# Patient Record
Sex: FEMALE | Race: WHITE
[De-identification: names, ages, dates, MRNs, and addresses within clinical notes are randomized per-mention and may not be internally consistent; named-entity substitution may affect disease eponyms.]

---

## 2017-03-06 ENCOUNTER — APPOINTMENT (OUTPATIENT)
Dept: MAMMOGRAPHY | Facility: HOSPITAL | Age: 42
End: 2017-03-06

## 2017-03-06 ENCOUNTER — APPOINTMENT (OUTPATIENT)
Dept: ULTRASOUND IMAGING | Facility: HOSPITAL | Age: 42
End: 2017-03-06

## 2017-03-06 ENCOUNTER — OUTPATIENT (OUTPATIENT)
Dept: OUTPATIENT SERVICES | Facility: HOSPITAL | Age: 42
LOS: 1 days | End: 2017-03-06
Payer: COMMERCIAL

## 2017-03-06 DIAGNOSIS — R92.2 INCONCLUSIVE MAMMOGRAM: ICD-10-CM

## 2017-03-06 PROCEDURE — G0279: CPT

## 2017-03-06 PROCEDURE — 76641 ULTRASOUND BREAST COMPLETE: CPT

## 2017-03-06 PROCEDURE — 77065 DX MAMMO INCL CAD UNI: CPT

## 2018-08-20 ENCOUNTER — APPOINTMENT (OUTPATIENT)
Dept: OBGYN | Facility: CLINIC | Age: 43
End: 2018-08-20
Payer: COMMERCIAL

## 2018-08-20 ENCOUNTER — ASOB RESULT (OUTPATIENT)
Age: 43
End: 2018-08-20

## 2018-08-20 VITALS
SYSTOLIC BLOOD PRESSURE: 121 MMHG | BODY MASS INDEX: 23.28 KG/M2 | WEIGHT: 126.5 LBS | HEART RATE: 66 BPM | DIASTOLIC BLOOD PRESSURE: 73 MMHG | HEIGHT: 62 IN

## 2018-08-20 PROCEDURE — 76830 TRANSVAGINAL US NON-OB: CPT

## 2018-08-20 PROCEDURE — 99202 OFFICE O/P NEW SF 15 MIN: CPT

## 2018-08-21 LAB — BACTERIA UR CULT: NORMAL

## 2018-10-05 ENCOUNTER — APPOINTMENT (OUTPATIENT)
Dept: OBGYN | Facility: CLINIC | Age: 43
End: 2018-10-05

## 2019-10-09 ENCOUNTER — NON-APPOINTMENT (OUTPATIENT)
Age: 44
End: 2019-10-09

## 2019-10-09 ENCOUNTER — TRANSCRIPTION ENCOUNTER (OUTPATIENT)
Age: 44
End: 2019-10-09

## 2019-10-09 ENCOUNTER — APPOINTMENT (OUTPATIENT)
Dept: FAMILY MEDICINE | Facility: CLINIC | Age: 44
End: 2019-10-09
Payer: COMMERCIAL

## 2019-10-09 VITALS
HEIGHT: 61 IN | TEMPERATURE: 97.8 F | DIASTOLIC BLOOD PRESSURE: 82 MMHG | OXYGEN SATURATION: 98 % | SYSTOLIC BLOOD PRESSURE: 114 MMHG | BODY MASS INDEX: 23.41 KG/M2 | WEIGHT: 124 LBS | RESPIRATION RATE: 16 BRPM | HEART RATE: 75 BPM

## 2019-10-09 DIAGNOSIS — Z00.00 ENCOUNTER FOR GENERAL ADULT MEDICAL EXAMINATION W/OUT ABNORMAL FINDINGS: ICD-10-CM

## 2019-10-09 DIAGNOSIS — R10.2 PELVIC AND PERINEAL PAIN: ICD-10-CM

## 2019-10-09 DIAGNOSIS — Z87.42 PERSONAL HISTORY OF OTHER DISEASES OF THE FEMALE GENITAL TRACT: ICD-10-CM

## 2019-10-09 DIAGNOSIS — Z80.52 FAMILY HISTORY OF MALIGNANT NEOPLASM OF BLADDER: ICD-10-CM

## 2019-10-09 PROCEDURE — 93000 ELECTROCARDIOGRAM COMPLETE: CPT

## 2019-10-09 PROCEDURE — G0444 DEPRESSION SCREEN ANNUAL: CPT | Mod: 59

## 2019-10-09 PROCEDURE — 99386 PREV VISIT NEW AGE 40-64: CPT | Mod: 25

## 2019-10-09 PROCEDURE — 99406 BEHAV CHNG SMOKING 3-10 MIN: CPT

## 2019-10-16 LAB
25(OH)D3 SERPL-MCNC: 30.3 NG/ML
ALBUMIN SERPL ELPH-MCNC: 4.5 G/DL
ALP BLD-CCNC: 50 U/L
ALT SERPL-CCNC: 9 U/L
ANION GAP SERPL CALC-SCNC: 12 MMOL/L
APPEARANCE: CLEAR
AST SERPL-CCNC: 13 U/L
BASOPHILS # BLD AUTO: 0.04 K/UL
BASOPHILS NFR BLD AUTO: 0.5 %
BILIRUB SERPL-MCNC: 0.2 MG/DL
BILIRUBIN URINE: NEGATIVE
BLOOD URINE: ABNORMAL
BUN SERPL-MCNC: 12 MG/DL
CALCIUM SERPL-MCNC: 9.3 MG/DL
CHLORIDE SERPL-SCNC: 102 MMOL/L
CHOLEST SERPL-MCNC: 210 MG/DL
CHOLEST/HDLC SERPL: 3.5 RATIO
CO2 SERPL-SCNC: 26 MMOL/L
COLOR: NORMAL
CREAT SERPL-MCNC: 0.78 MG/DL
EOSINOPHIL # BLD AUTO: 0.04 K/UL
EOSINOPHIL NFR BLD AUTO: 0.5 %
ESTIMATED AVERAGE GLUCOSE: 105 MG/DL
FOLATE SERPL-MCNC: 17.2 NG/ML
GLUCOSE QUALITATIVE U: NEGATIVE
GLUCOSE SERPL-MCNC: 87 MG/DL
HAV IGM SER QL: NONREACTIVE
HBA1C MFR BLD HPLC: 5.3 %
HBV CORE IGM SER QL: NONREACTIVE
HBV SURFACE AG SER QL: NONREACTIVE
HCT VFR BLD CALC: 43.7 %
HCV AB SER QL: NONREACTIVE
HCV S/CO RATIO: 0.14 S/CO
HDLC SERPL-MCNC: 60 MG/DL
HGB BLD-MCNC: 14.3 G/DL
HIV1+2 AB SPEC QL IA.RAPID: NONREACTIVE
IMM GRANULOCYTES NFR BLD AUTO: 0.2 %
KETONES URINE: NEGATIVE
LDLC SERPL CALC-MCNC: 133 MG/DL
LEUKOCYTE ESTERASE URINE: NEGATIVE
LYMPHOCYTES # BLD AUTO: 2.7 K/UL
LYMPHOCYTES NFR BLD AUTO: 31.8 %
MAN DIFF?: NORMAL
MCHC RBC-ENTMCNC: 30 PG
MCHC RBC-ENTMCNC: 32.7 GM/DL
MCV RBC AUTO: 91.6 FL
MONOCYTES # BLD AUTO: 0.56 K/UL
MONOCYTES NFR BLD AUTO: 6.6 %
NEUTROPHILS # BLD AUTO: 5.13 K/UL
NEUTROPHILS NFR BLD AUTO: 60.4 %
NITRITE URINE: NEGATIVE
PH URINE: 7.5
PLATELET # BLD AUTO: 225 K/UL
POTASSIUM SERPL-SCNC: 4.4 MMOL/L
PROT SERPL-MCNC: 6.6 G/DL
PROTEIN URINE: NEGATIVE
RBC # BLD: 4.77 M/UL
RBC # FLD: 12.1 %
SODIUM SERPL-SCNC: 140 MMOL/L
SPECIFIC GRAVITY URINE: 1.01
TRIGL SERPL-MCNC: 84 MG/DL
TSH SERPL-ACNC: 1.64 UIU/ML
URATE SERPL-MCNC: 3.6 MG/DL
UROBILINOGEN URINE: NORMAL
VIT B12 SERPL-MCNC: 243 PG/ML
WBC # FLD AUTO: 8.49 K/UL

## 2019-10-18 ENCOUNTER — OTHER (OUTPATIENT)
Age: 44
End: 2019-10-18

## 2019-10-18 DIAGNOSIS — Z01.818 ENCOUNTER FOR OTHER PREPROCEDURAL EXAMINATION: ICD-10-CM

## 2019-10-19 LAB
APTT BLD: 30.6 SEC
INR PPP: 0.98 RATIO
PT BLD: 11.1 SEC

## 2019-10-22 ENCOUNTER — FORM ENCOUNTER (OUTPATIENT)
Age: 44
End: 2019-10-22

## 2019-10-23 ENCOUNTER — OUTPATIENT (OUTPATIENT)
Dept: OUTPATIENT SERVICES | Facility: HOSPITAL | Age: 44
LOS: 1 days | End: 2019-10-23
Payer: COMMERCIAL

## 2019-10-23 ENCOUNTER — APPOINTMENT (OUTPATIENT)
Dept: ULTRASOUND IMAGING | Facility: HOSPITAL | Age: 44
End: 2019-10-23
Payer: COMMERCIAL

## 2019-10-23 ENCOUNTER — APPOINTMENT (OUTPATIENT)
Dept: MAMMOGRAPHY | Facility: HOSPITAL | Age: 44
End: 2019-10-23
Payer: COMMERCIAL

## 2019-10-23 DIAGNOSIS — Z00.8 ENCOUNTER FOR OTHER GENERAL EXAMINATION: ICD-10-CM

## 2019-10-23 PROCEDURE — 76641 ULTRASOUND BREAST COMPLETE: CPT | Mod: 26,50

## 2019-10-23 PROCEDURE — 76641 ULTRASOUND BREAST COMPLETE: CPT

## 2019-10-23 PROCEDURE — 77067 SCR MAMMO BI INCL CAD: CPT | Mod: 26

## 2019-10-23 PROCEDURE — 77063 BREAST TOMOSYNTHESIS BI: CPT

## 2019-10-23 PROCEDURE — 77063 BREAST TOMOSYNTHESIS BI: CPT | Mod: 26

## 2019-10-23 PROCEDURE — 77067 SCR MAMMO BI INCL CAD: CPT

## 2019-10-28 ENCOUNTER — APPOINTMENT (OUTPATIENT)
Dept: FAMILY MEDICINE | Facility: CLINIC | Age: 44
End: 2019-10-28
Payer: COMMERCIAL

## 2019-10-28 VITALS
TEMPERATURE: 98.1 F | OXYGEN SATURATION: 96 % | SYSTOLIC BLOOD PRESSURE: 102 MMHG | BODY MASS INDEX: 23.41 KG/M2 | HEART RATE: 60 BPM | DIASTOLIC BLOOD PRESSURE: 72 MMHG | WEIGHT: 124 LBS | RESPIRATION RATE: 17 BRPM | HEIGHT: 61 IN

## 2019-10-28 DIAGNOSIS — E78.5 HYPERLIPIDEMIA, UNSPECIFIED: ICD-10-CM

## 2019-10-28 LAB — HCG SERPL-MCNC: <1 MIU/ML

## 2019-10-28 PROCEDURE — 36415 COLL VENOUS BLD VENIPUNCTURE: CPT

## 2019-10-28 PROCEDURE — 99214 OFFICE O/P EST MOD 30 MIN: CPT | Mod: 25

## 2019-10-28 NOTE — COUNSELING
[Fall prevention counseling provided] : Fall prevention counseling provided [Adequate lighting] : Adequate lighting [Use proper foot wear] : Use proper foot wear [No throw rugs] : No throw rugs [Use recommended devices] : Use recommended devices [Risk of tobacco use and health benefits of smoking cessation discussed] : Risk of tobacco use and health benefits of smoking cessation discussed [Cessation strategies including cessation program discussed] : Cessation strategies including cessation program discussed [Use of nicotine replacement therapies and other medications discussed] : Use of nicotine replacement therapies and other medications discussed [Encouraged to pick a quit date and identify support needed to quit] : Encouraged to pick a quit date and identify support needed to quit [Willing to Quit Smoking] : Willing to quit smoking [Tobacco Use Cessation Intermediate Greater Than 3 Minutes Up to 10 Minutes] : Tobacco Use Cessation Intermediate Greater Than 3 Minutes Up to 10 Minutes [Good understanding] : Patient has a good understanding of lifestyle changes and steps needed to achieve self management goal [FreeTextEntry1] : 3

## 2019-10-28 NOTE — PHYSICAL EXAM
[No Varicosities] : no varicosities [No Edema] : there was no peripheral edema [Normal] : affect was normal and insight and judgment were intact

## 2019-10-28 NOTE — HEALTH RISK ASSESSMENT
[Very Good] : ~his/her~  mood as very good [] : Yes [21-25] : 21-25 [Yes] : Yes [Monthly or less (1 pt)] : Monthly or less (1 point) [1 or 2 (0 pts)] : 1 or 2 (0 points) [Never (0 pts)] : Never (0 points) [No] : In the past 12 months have you used drugs other than those required for medical reasons? No [No falls in past year] : Patient reported no falls in the past year [0] : 2) Feeling down, depressed, or hopeless: Not at all (0) [Patient reported mammogram was normal] : Patient reported mammogram was normal [HIV Test offered] : HIV Test offered [Hepatitis C test offered] : Hepatitis C test offered [None] : None [With Family] : lives with family [Employed] : employed [High School] : high school [# Of Children ___] : has [unfilled] children [] :  [Sexually Active] : sexually active [Fully functional (bathing, dressing, toileting, transferring, walking, feeding)] : Fully functional (bathing, dressing, toileting, transferring, walking, feeding) [Feels Safe at Home] : Feels safe at home [Fully functional (using the telephone, shopping, preparing meals, housekeeping, doing laundry, using] : Fully functional and needs no help or supervision to perform IADLs (using the telephone, shopping, preparing meals, housekeeping, doing laundry, using transportation, managing medications and managing finances) [Carbon Monoxide Detector] : carbon monoxide detector [Smoke Detector] : smoke detector [Seat Belt] :  uses seat belt [Sunscreen] : uses sunscreen [With Patient/Caregiver] : With Patient/Caregiver [Aggressive treatment] : aggressive treatment [FreeTextEntry1] : none [de-identified] : No [de-identified] : none [Audit-CScore] : 1 [de-identified] : [employer]  [de-identified] : [place]  [[id number]] : 0 [Change in mental status noted] : No change in mental status noted [Language] : denies difficulty with language [Reports changes in hearing] : Reports no changes in hearing [Reports changes in vision] : Reports no changes in vision [Reports changes in dental health] : Reports no changes in dental health [MammogramDate] : 07/16 [PapSmearDate] : 11/14 [BoneDensityDate] : never [ColonoscopyDate] : never [AdvancecareDate] : 10/19

## 2019-10-28 NOTE — PHYSICAL EXAM
[Normal] : normal rate, regular rhythm, normal S1 and S2 and no murmur heard [No Edema] : there was no peripheral edema [No Varicosities] : no varicosities [Normal Appearance] : normal in appearance [No Masses] : no palpable masses [No Nipple Discharge] : no nipple discharge [No Axillary Lymphadenopathy] : no axillary lymphadenopathy [de-identified] : + b/l implants

## 2019-10-28 NOTE — HISTORY OF PRESENT ILLNESS
[FreeTextEntry1] : cc: establish new pt , physical  [de-identified] : Patient came  for physical. She denies CP,SOB,Abd pain, no N,V,C,D.\par \par

## 2019-10-28 NOTE — HISTORY OF PRESENT ILLNESS
[FreeTextEntry1] : cc: f/u lipids, microhematuria, pt needs a clearance for her plastic surgery  [de-identified] : Patient denies CP, SOB, abd pain , She will get  b/l upper and lower blepharoplasty  on 10/30/19 -Dr. Angel Vicente . She had recent CPE , only HCG will be done today , otherwise patient is medically stabilized for her procedure . patient's recent blood work show elevated lipids - chronic , patient is watching her diet. Patient deneis Hx of renal stones, not sure about her period.

## 2019-10-28 NOTE — ADDENDUM
[FreeTextEntry1] : Patient is getting done b/l  upper and lower  blepharoplasty on 10/30/19  done by Dr. Angel Vicente. Patient is medically optimized for proposed procedure. Pregnancy test done today , f/u results . \par \par \par

## 2019-10-28 NOTE — DATA REVIEWED
[FreeTextEntry1] : EKG - NSR  AT 69 BPM, NO ACUTEST - T CHANGES \par \par last blood work d/w the pt at length\par

## 2019-11-06 ENCOUNTER — APPOINTMENT (OUTPATIENT)
Dept: OBGYN | Facility: CLINIC | Age: 44
End: 2019-11-06
Payer: COMMERCIAL

## 2019-11-06 VITALS
HEART RATE: 74 BPM | SYSTOLIC BLOOD PRESSURE: 124 MMHG | HEIGHT: 61 IN | DIASTOLIC BLOOD PRESSURE: 83 MMHG | WEIGHT: 127 LBS | BODY MASS INDEX: 23.98 KG/M2

## 2019-11-06 DIAGNOSIS — Z01.419 ENCOUNTER FOR GYNECOLOGICAL EXAMINATION (GENERAL) (ROUTINE) W/OUT ABNORMAL FINDINGS: ICD-10-CM

## 2019-11-06 DIAGNOSIS — R31.29 OTHER MICROSCOPIC HEMATURIA: ICD-10-CM

## 2019-11-06 PROCEDURE — 99396 PREV VISIT EST AGE 40-64: CPT

## 2019-11-12 LAB
BACTERIA UR CULT: NORMAL
CYTOLOGY CVX/VAG DOC THIN PREP: NORMAL
HPV HIGH+LOW RISK DNA PNL CVX: NOT DETECTED

## 2019-11-12 NOTE — HISTORY OF PRESENT ILLNESS
[1 Year Ago] : 1 year ago [Regular Exercise] : regular exercise [Last Mammogram ___] : Last Mammogram was [unfilled] [Last Pap ___] : Last cervical pap smear was [unfilled] [Contraception] : uses contraception [Tubal Occlusion] : has had a tubal occlusion [Sexually Active] : is sexually active [Monogamous (Male Partner)] : is monogamous with a male partner [Regular Cycle Intervals] : periods have been regular [Weight Concerns] : no concerns with her weight

## 2020-08-25 ENCOUNTER — INPATIENT (INPATIENT)
Facility: HOSPITAL | Age: 45
LOS: 6 days | Discharge: HOME HEALTH | DRG: 854 | End: 2020-09-01
Attending: SPECIALIST | Admitting: SURGERY
Payer: COMMERCIAL

## 2020-08-25 ENCOUNTER — TRANSCRIPTION ENCOUNTER (OUTPATIENT)
Age: 45
End: 2020-08-25

## 2020-08-25 VITALS — HEIGHT: 62 IN | WEIGHT: 119.93 LBS

## 2020-08-25 DIAGNOSIS — Z98.51 TUBAL LIGATION STATUS: Chronic | ICD-10-CM

## 2020-08-25 DIAGNOSIS — Z98.82 BREAST IMPLANT STATUS: Chronic | ICD-10-CM

## 2020-08-25 DIAGNOSIS — K57.32 DIVERTICULITIS OF LARGE INTESTINE WITHOUT PERFORATION OR ABSCESS WITHOUT BLEEDING: ICD-10-CM

## 2020-08-25 LAB
APPEARANCE UR: CLEAR — SIGNIFICANT CHANGE UP
BASOPHILS # BLD AUTO: 0.06 K/UL — SIGNIFICANT CHANGE UP (ref 0–0.2)
BASOPHILS NFR BLD AUTO: 0.4 % — SIGNIFICANT CHANGE UP (ref 0–2)
BILIRUB UR-MCNC: NEGATIVE — SIGNIFICANT CHANGE UP
COLOR SPEC: SIGNIFICANT CHANGE UP
DIFF PNL FLD: NEGATIVE — SIGNIFICANT CHANGE UP
EOSINOPHIL # BLD AUTO: 0.09 K/UL — SIGNIFICANT CHANGE UP (ref 0–0.5)
EOSINOPHIL NFR BLD AUTO: 0.6 % — SIGNIFICANT CHANGE UP (ref 0–6)
GLUCOSE UR QL: NEGATIVE MG/DL — SIGNIFICANT CHANGE UP
HCT VFR BLD CALC: 43 % — SIGNIFICANT CHANGE UP (ref 34.5–45)
HGB BLD-MCNC: 14.3 G/DL — SIGNIFICANT CHANGE UP (ref 11.5–15.5)
IMM GRANULOCYTES NFR BLD AUTO: 0.4 % — SIGNIFICANT CHANGE UP (ref 0–1.5)
KETONES UR-MCNC: NEGATIVE — SIGNIFICANT CHANGE UP
LACTATE SERPL-SCNC: 0.9 MMOL/L — SIGNIFICANT CHANGE UP (ref 0.7–2)
LEUKOCYTE ESTERASE UR-ACNC: NEGATIVE — SIGNIFICANT CHANGE UP
LYMPHOCYTES # BLD AUTO: 17.5 % — SIGNIFICANT CHANGE UP (ref 13–44)
LYMPHOCYTES # BLD AUTO: 2.47 K/UL — SIGNIFICANT CHANGE UP (ref 1–3.3)
MCHC RBC-ENTMCNC: 30.3 PG — SIGNIFICANT CHANGE UP (ref 27–34)
MCHC RBC-ENTMCNC: 33.3 GM/DL — SIGNIFICANT CHANGE UP (ref 32–36)
MCV RBC AUTO: 91.1 FL — SIGNIFICANT CHANGE UP (ref 80–100)
MONOCYTES # BLD AUTO: 0.79 K/UL — SIGNIFICANT CHANGE UP (ref 0–0.9)
MONOCYTES NFR BLD AUTO: 5.6 % — SIGNIFICANT CHANGE UP (ref 2–14)
NEUTROPHILS # BLD AUTO: 10.65 K/UL — HIGH (ref 1.8–7.4)
NEUTROPHILS NFR BLD AUTO: 75.5 % — SIGNIFICANT CHANGE UP (ref 43–77)
NITRITE UR-MCNC: NEGATIVE — SIGNIFICANT CHANGE UP
PH UR: 8 — SIGNIFICANT CHANGE UP (ref 5–8)
PLATELET # BLD AUTO: 209 K/UL — SIGNIFICANT CHANGE UP (ref 150–400)
PROT UR-MCNC: NEGATIVE MG/DL — SIGNIFICANT CHANGE UP
RBC # BLD: 4.72 M/UL — SIGNIFICANT CHANGE UP (ref 3.8–5.2)
RBC # FLD: 12.3 % — SIGNIFICANT CHANGE UP (ref 10.3–14.5)
SARS-COV-2 RNA SPEC QL NAA+PROBE: SIGNIFICANT CHANGE UP
SP GR SPEC: 1.01 — SIGNIFICANT CHANGE UP (ref 1.01–1.02)
UROBILINOGEN FLD QL: NEGATIVE MG/DL — SIGNIFICANT CHANGE UP
WBC # BLD: 14.11 K/UL — HIGH (ref 3.8–10.5)
WBC # FLD AUTO: 14.11 K/UL — HIGH (ref 3.8–10.5)

## 2020-08-25 PROCEDURE — 86901 BLOOD TYPING SEROLOGIC RH(D): CPT

## 2020-08-25 PROCEDURE — C1765: CPT

## 2020-08-25 PROCEDURE — 85610 PROTHROMBIN TIME: CPT

## 2020-08-25 PROCEDURE — 71045 X-RAY EXAM CHEST 1 VIEW: CPT

## 2020-08-25 PROCEDURE — 99222 1ST HOSP IP/OBS MODERATE 55: CPT

## 2020-08-25 PROCEDURE — 86900 BLOOD TYPING SEROLOGIC ABO: CPT

## 2020-08-25 PROCEDURE — 85730 THROMBOPLASTIN TIME PARTIAL: CPT

## 2020-08-25 PROCEDURE — 85027 COMPLETE CBC AUTOMATED: CPT

## 2020-08-25 PROCEDURE — 97161 PT EVAL LOW COMPLEX 20 MIN: CPT | Mod: GP

## 2020-08-25 PROCEDURE — 87070 CULTURE OTHR SPECIMN AEROBIC: CPT

## 2020-08-25 PROCEDURE — 84702 CHORIONIC GONADOTROPIN TEST: CPT

## 2020-08-25 PROCEDURE — 87102 FUNGUS ISOLATION CULTURE: CPT

## 2020-08-25 PROCEDURE — C1889: CPT

## 2020-08-25 PROCEDURE — 74019 RADEX ABDOMEN 2 VIEWS: CPT

## 2020-08-25 PROCEDURE — 86850 RBC ANTIBODY SCREEN: CPT

## 2020-08-25 PROCEDURE — 87040 BLOOD CULTURE FOR BACTERIA: CPT

## 2020-08-25 PROCEDURE — C9113: CPT

## 2020-08-25 PROCEDURE — U0003: CPT

## 2020-08-25 PROCEDURE — 93005 ELECTROCARDIOGRAM TRACING: CPT

## 2020-08-25 PROCEDURE — 84484 ASSAY OF TROPONIN QUANT: CPT

## 2020-08-25 PROCEDURE — 80053 COMPREHEN METABOLIC PANEL: CPT

## 2020-08-25 PROCEDURE — 80048 BASIC METABOLIC PNL TOTAL CA: CPT

## 2020-08-25 PROCEDURE — 88307 TISSUE EXAM BY PATHOLOGIST: CPT

## 2020-08-25 PROCEDURE — 84100 ASSAY OF PHOSPHORUS: CPT

## 2020-08-25 PROCEDURE — 36415 COLL VENOUS BLD VENIPUNCTURE: CPT

## 2020-08-25 PROCEDURE — 83605 ASSAY OF LACTIC ACID: CPT

## 2020-08-25 PROCEDURE — 82962 GLUCOSE BLOOD TEST: CPT

## 2020-08-25 PROCEDURE — 74177 CT ABD & PELVIS W/CONTRAST: CPT

## 2020-08-25 PROCEDURE — 83735 ASSAY OF MAGNESIUM: CPT

## 2020-08-25 PROCEDURE — 74177 CT ABD & PELVIS W/CONTRAST: CPT | Mod: 26

## 2020-08-25 PROCEDURE — 87075 CULTR BACTERIA EXCEPT BLOOD: CPT

## 2020-08-25 RX ORDER — NICOTINE POLACRILEX 2 MG
1 GUM BUCCAL DAILY
Refills: 0 | Status: DISCONTINUED | OUTPATIENT
Start: 2020-08-25 | End: 2020-08-27

## 2020-08-25 RX ORDER — KETOROLAC TROMETHAMINE 30 MG/ML
15 SYRINGE (ML) INJECTION EVERY 8 HOURS
Refills: 0 | Status: DISCONTINUED | OUTPATIENT
Start: 2020-08-25 | End: 2020-08-27

## 2020-08-25 RX ORDER — ONDANSETRON 8 MG/1
4 TABLET, FILM COATED ORAL EVERY 8 HOURS
Refills: 0 | Status: DISCONTINUED | OUTPATIENT
Start: 2020-08-25 | End: 2020-08-27

## 2020-08-25 RX ORDER — CIPROFLOXACIN LACTATE 400MG/40ML
400 VIAL (ML) INTRAVENOUS EVERY 12 HOURS
Refills: 0 | Status: DISCONTINUED | OUTPATIENT
Start: 2020-08-25 | End: 2020-08-27

## 2020-08-25 RX ORDER — NICOTINE POLACRILEX 2 MG
1 GUM BUCCAL DAILY
Refills: 0 | Status: DISCONTINUED | OUTPATIENT
Start: 2020-08-25 | End: 2020-08-25

## 2020-08-25 RX ORDER — PIPERACILLIN AND TAZOBACTAM 4; .5 G/20ML; G/20ML
3.38 INJECTION, POWDER, LYOPHILIZED, FOR SOLUTION INTRAVENOUS ONCE
Refills: 0 | Status: COMPLETED | OUTPATIENT
Start: 2020-08-25 | End: 2020-08-25

## 2020-08-25 RX ORDER — SODIUM CHLORIDE 9 MG/ML
1000 INJECTION, SOLUTION INTRAVENOUS
Refills: 0 | Status: DISCONTINUED | OUTPATIENT
Start: 2020-08-25 | End: 2020-08-27

## 2020-08-25 RX ORDER — PANTOPRAZOLE SODIUM 20 MG/1
40 TABLET, DELAYED RELEASE ORAL DAILY
Refills: 0 | Status: DISCONTINUED | OUTPATIENT
Start: 2020-08-25 | End: 2020-08-27

## 2020-08-25 RX ORDER — SODIUM CHLORIDE 9 MG/ML
1000 INJECTION INTRAMUSCULAR; INTRAVENOUS; SUBCUTANEOUS ONCE
Refills: 0 | Status: COMPLETED | OUTPATIENT
Start: 2020-08-25 | End: 2020-08-25

## 2020-08-25 RX ORDER — METRONIDAZOLE 500 MG
500 TABLET ORAL EVERY 8 HOURS
Refills: 0 | Status: DISCONTINUED | OUTPATIENT
Start: 2020-08-25 | End: 2020-08-27

## 2020-08-25 RX ORDER — MORPHINE SULFATE 50 MG/1
2 CAPSULE, EXTENDED RELEASE ORAL EVERY 6 HOURS
Refills: 0 | Status: DISCONTINUED | OUTPATIENT
Start: 2020-08-25 | End: 2020-08-27

## 2020-08-25 RX ORDER — HEPARIN SODIUM 5000 [USP'U]/ML
5000 INJECTION INTRAVENOUS; SUBCUTANEOUS EVERY 8 HOURS
Refills: 0 | Status: DISCONTINUED | OUTPATIENT
Start: 2020-08-25 | End: 2020-08-27

## 2020-08-25 RX ADMIN — Medication 1 PATCH: at 17:44

## 2020-08-25 RX ADMIN — MORPHINE SULFATE 2 MILLIGRAM(S): 50 CAPSULE, EXTENDED RELEASE ORAL at 22:25

## 2020-08-25 RX ADMIN — Medication 15 MILLIGRAM(S): at 17:00

## 2020-08-25 RX ADMIN — PIPERACILLIN AND TAZOBACTAM 3.38 GRAM(S): 4; .5 INJECTION, POWDER, LYOPHILIZED, FOR SOLUTION INTRAVENOUS at 15:33

## 2020-08-25 RX ADMIN — Medication 15 MILLIGRAM(S): at 16:42

## 2020-08-25 RX ADMIN — Medication 200 MILLIGRAM(S): at 17:43

## 2020-08-25 RX ADMIN — SODIUM CHLORIDE 100 MILLILITER(S): 9 INJECTION, SOLUTION INTRAVENOUS at 19:38

## 2020-08-25 RX ADMIN — SODIUM CHLORIDE 1000 MILLILITER(S): 9 INJECTION INTRAMUSCULAR; INTRAVENOUS; SUBCUTANEOUS at 14:53

## 2020-08-25 RX ADMIN — SODIUM CHLORIDE 1000 MILLILITER(S): 9 INJECTION INTRAMUSCULAR; INTRAVENOUS; SUBCUTANEOUS at 11:43

## 2020-08-25 RX ADMIN — SODIUM CHLORIDE 1000 MILLILITER(S): 9 INJECTION INTRAMUSCULAR; INTRAVENOUS; SUBCUTANEOUS at 14:52

## 2020-08-25 RX ADMIN — PANTOPRAZOLE SODIUM 40 MILLIGRAM(S): 20 TABLET, DELAYED RELEASE ORAL at 17:45

## 2020-08-25 RX ADMIN — Medication 100 MILLIGRAM(S): at 21:26

## 2020-08-25 RX ADMIN — Medication 1 PATCH: at 19:25

## 2020-08-25 RX ADMIN — HEPARIN SODIUM 5000 UNIT(S): 5000 INJECTION INTRAVENOUS; SUBCUTANEOUS at 21:26

## 2020-08-25 RX ADMIN — PIPERACILLIN AND TAZOBACTAM 200 GRAM(S): 4; .5 INJECTION, POWDER, LYOPHILIZED, FOR SOLUTION INTRAVENOUS at 14:52

## 2020-08-25 NOTE — H&P ADULT - NSHPPHYSICALEXAM_GEN_ALL_CORE
· CONSTITUTIONAL: well appearing and in no apparent distress.  · EYES: clear bilaterally.  Pupils equal, round, and reactive to light.  · ENMT: Nasal mucosa clear.  Mouth with normal mucosa  Throat has no vesicles, no oropharyngeal exudates and uvula is midline.  · CARDIAC: normal rate, regular rhythm, and no murmur.  · RESPIRATORY: breath sounds clear and equal bilaterally.  · GASTROINTESTINAL: abdomen soft, and non-distended. Bowel sounds present. RLQ tenderness.  · MUSCULOSKELETAL: range of motion is not limited and there is no muscle tenderness.  · NEUROLOGICAL: sensation is normal and strength is normal.  · SKIN: skin normal color for race, warm, dry and intact. · CONSTITUTIONAL: well appearing and in no apparent distress.  · EYES: anicteric  · ENMT: Nasal mucosa clear.  Mouth with normal mucosa    · CARDIAC: normal rate, regular rhythm, and no murmur.  · RESPIRATORY: breath sounds clear and equal bilaterally.  · GASTROINTESTINAL: abdomen soft, and non-distended. Bowel sounds present. RLQ tenderness.  · MUSCULOSKELETAL: range of motion is not limited and there is no muscle tenderness.  · NEUROLOGICAL: sensation is normal and strength is normal.  · SKIN: skin normal color for race, warm, dry and intact.

## 2020-08-25 NOTE — H&P ADULT - ATTENDING COMMENTS
Patient was seen and examined in ED. History, labs and imaging reviewed. She is non toxic on exam and abdomen is soft, non distended and tender in the lower abdomen more on the right without rebound or guarding. CT scan reviewed with diverticulitis, perforated without abscess or collection. Extraluminal air is adjacent to colon. Plan for non operative management of diverticulitis with bowel rest, IV abx, fluids and serial abdominal exam

## 2020-08-25 NOTE — PATIENT PROFILE ADULT - NSPROEDALEARNPREFOTH_GEN_A_NUR
computer/internet/group instruction/pictorial/verbal instruction/video/audio/skill demonstration/written material/individual instruction

## 2020-08-25 NOTE — ED ADULT NURSE NOTE - OBJECTIVE STATEMENT
Patient comes to ED for RLQ abdominal pain for 2 days. pt denies any n/v/d. Patient denies any fever or chills.

## 2020-08-25 NOTE — ED ADULT NURSE NOTE - ED STAT RN HANDOFF DETAILS
handoff report taken from day RN. pt. noted resting comfortably in stretcher. No distress noted, denies pain, safety maintained. WCTM

## 2020-08-25 NOTE — H&P ADULT - ASSESSMENT
45 y/o female with no significant pmhx presents to the ED c/o RUQ pain for two days. CT scan showed: Sigmoid diverticulitis with a localized perforation.    Plan:    - Admit to colorectal service under Dr. Oh.  - NPO.  - IVF  - IV cipro, flagyl  - Trend CBC.  - Pain control as needed.  - GI and DVT ppx.    The plan was discussed with Dr. Oh.

## 2020-08-25 NOTE — H&P ADULT - NSICDXPASTSURGICALHX_GEN_ALL_CORE_FT
PAST SURGICAL HISTORY:  H/O tubal ligation PAST SURGICAL HISTORY:  H/O breast augmentation     H/O tubal ligation

## 2020-08-25 NOTE — ED STATDOCS - ATTENDING CONTRIBUTION TO CARE
I, Marianne Keita DO,  performed the initial face to face bedside interview with this patient regarding history of present illness, review of symptoms and relevant past medical, social and family history.  I completed an independent physical examination.  I was the initial provider who evaluated this patient. I have signed out the follow up of any pending tests (i.e. labs, radiological studies) to the ACP.  I have communicated the patient’s plan of care and disposition with the ACP.  The history, relevant review of systems, past medical and surgical history, medical decision making, and physical examination was documented by the scribe in my presence and I attest to the accuracy of the documentation.

## 2020-08-25 NOTE — ED STATDOCS - OBJECTIVE STATEMENT
43 y/o female with no significant pmhx presents to the ED c/o RUQ pain for two days. pt states that the pain is generalized in the RUQ but is all on the abd. pt went to  and was sent here for further eval. Had a UA done there which came back normal. +nausea +body aches Denies vomiting, fever. 43 y/o female with no significant pmhx presents to the ED c/o RL! pain for two days. pt states that the pain is generalized in the RLQ but is all on the abd. pt went to  and was sent here for further eval. Had a UA done there which came back normal. +nausea +body aches Denies vomiting, fever; no diarrhea; no constipation.

## 2020-08-25 NOTE — H&P ADULT - NSHPLABSRESULTS_GEN_ALL_CORE
14.3   14.11 )-----------( 209      ( 25 Aug 2020 11:23 )             43.0   08-25    142  |  109<H>  |  9   ----------------------------<  74  4.2   |  29  |  0.65    Ca    8.1<L>      25 Aug 2020 12:28    TPro  6.3  /  Alb  3.1<L>  /  TBili  0.3  /  DBili  x   /  AST  8<L>  /  ALT  10<L>  /  AlkPhos  41  08-25      Imaging:     EXAM:  CT ABDOMEN AND PELVIS IC                            PROCEDURE DATE:  08/25/2020          INTERPRETATION:  CLINICAL INFORMATION: Right lower quadrant pain    COMPARISON: CT abdomen/pelvis 7/27/2009    PROCEDURE:  CT of the Abdomen and Pelvis was performed with intravenous contrast.  Intravenous contrast: 90 ml Omnipaque 350. 10 ml discarded.  Oral contrast: None.  Sagittal and coronal reformats were performed.    FINDINGS:  LOWER CHEST: Lung bases are clear. Partially imaged bilateral breast implants.    LIVER: Areas of geographic low attenuation, likely fatty infiltration.  BILE DUCTS: Normal caliber.  GALLBLADDER: Within normal limits.  SPLEEN: Within normal limits.  PANCREAS: Within normal limits.  ADRENALS: Within normal limits.  KIDNEYS/URETERS: Within normal limits.    BLADDER: Unremarkable.  REPRODUCTIVE ORGANS: Posterior uterine body fibroid measuring 1.7 cm. Ovaries within normal limits for size.    BOWEL: Colonic diverticulosis with wall thickening and infiltration of the fat adjacent to the distal sigmoid colon, likely diverticulitis. Small droplets of air at the superior aspect of the sigmoid colon (series 602 image 37), likely a localized perforation. No abscess. Appendix is normal.  PERITONEUM: Small amount of pelvic ascites.  VESSELS: Abdominal aorta normal in caliber with mild calcified plaque.  RETROPERITONEUM/LYMPH NODES: No lymphadenopathy.  ABDOMINAL WALL: Tiny fat-containing umbilical hernia.  BONES: No aggressive osseous lesion.    IMPRESSION:  Sigmoid diverticulitis with a localized perforation.    No abscess. 14.3   14.11 )-----------( 209      ( 25 Aug 2020 11:23 )             43.0   08-25    142  |  109<H>  |  9   ----------------------------<  74  4.2   |  29  |  0.65    Ca    8.1<L>      25 Aug 2020 12:28    TPro  6.3  /  Alb  3.1<L>  /  TBili  0.3  /  DBili  x   /  AST  8<L>  /  ALT  10<L>  /  AlkPhos  41  08-25      Imaging:     EXAM:  CT ABDOMEN AND PELVIS IC                            PROCEDURE DATE:  08/25/2020      INTERPRETATION:  CLINICAL INFORMATION: Right lower quadrant pain    COMPARISON: CT abdomen/pelvis 7/27/2009    FINDINGS:  LOWER CHEST: Lung bases are clear. Partially imaged bilateral breast implants.    LIVER: Areas of geographic low attenuation, likely fatty infiltration.  BILE DUCTS: Normal caliber.  GALLBLADDER: Within normal limits.  SPLEEN: Within normal limits.  PANCREAS: Within normal limits.  ADRENALS: Within normal limits.  KIDNEYS/URETERS: Within normal limits.    BLADDER: Unremarkable.  REPRODUCTIVE ORGANS: Posterior uterine body fibroid measuring 1.7 cm. Ovaries within normal limits for size.    BOWEL: Colonic diverticulosis with wall thickening and infiltration of the fat adjacent to the distal sigmoid colon, likely diverticulitis. Small droplets of air at the superior aspect of the sigmoid colon (series 602 image 37), likely a localized perforation. No abscess. Appendix is normal.  PERITONEUM: Small amount of pelvic ascites.  VESSELS: Abdominal aorta normal in caliber with mild calcified plaque.  RETROPERITONEUM/LYMPH NODES: No lymphadenopathy.  ABDOMINAL WALL: Tiny fat-containing umbilical hernia.  BONES: No aggressive osseous lesion.    IMPRESSION:  Sigmoid diverticulitis with a localized perforation.    No abscess.

## 2020-08-25 NOTE — PATIENT PROFILE ADULT - NSPROEDALEARNPREF_GEN_A_NUR
video/pictorial/skill demonstration/individual instruction/audio/computer/internet/written material/group instruction/verbal instruction

## 2020-08-25 NOTE — ED STATDOCS - PROGRESS NOTE DETAILS
Patient updated on CT results and need for admission.  blood cultures, lactate and IV abx ordered.  Case d/w Dr. Oh on call for colorectal surgery who will admit patient to her service for further management -Lamont Ernandez PA-C

## 2020-08-26 LAB
ALBUMIN SERPL ELPH-MCNC: 2.8 G/DL — LOW (ref 3.3–5)
ALP SERPL-CCNC: 38 U/L — LOW (ref 40–120)
ALT FLD-CCNC: 9 U/L — LOW (ref 12–78)
ANION GAP SERPL CALC-SCNC: 8 MMOL/L — SIGNIFICANT CHANGE UP (ref 5–17)
APTT BLD: 32.7 SEC — SIGNIFICANT CHANGE UP (ref 27.5–35.5)
AST SERPL-CCNC: 13 U/L — LOW (ref 15–37)
BILIRUB SERPL-MCNC: 0.7 MG/DL — SIGNIFICANT CHANGE UP (ref 0.2–1.2)
BUN SERPL-MCNC: 8 MG/DL — SIGNIFICANT CHANGE UP (ref 7–23)
CALCIUM SERPL-MCNC: 7.8 MG/DL — LOW (ref 8.5–10.1)
CHLORIDE SERPL-SCNC: 106 MMOL/L — SIGNIFICANT CHANGE UP (ref 96–108)
CO2 SERPL-SCNC: 24 MMOL/L — SIGNIFICANT CHANGE UP (ref 22–31)
CREAT SERPL-MCNC: 0.74 MG/DL — SIGNIFICANT CHANGE UP (ref 0.5–1.3)
CULTURE RESULTS: SIGNIFICANT CHANGE UP
GLUCOSE SERPL-MCNC: 92 MG/DL — SIGNIFICANT CHANGE UP (ref 70–99)
HCG SERPL-ACNC: <1 MIU/ML — SIGNIFICANT CHANGE UP
HCT VFR BLD CALC: 36.7 % — SIGNIFICANT CHANGE UP (ref 34.5–45)
HGB BLD-MCNC: 11.9 G/DL — SIGNIFICANT CHANGE UP (ref 11.5–15.5)
INR BLD: 1.13 RATIO — SIGNIFICANT CHANGE UP (ref 0.88–1.16)
LACTATE SERPL-SCNC: 1.2 MMOL/L — SIGNIFICANT CHANGE UP (ref 0.7–2)
MCHC RBC-ENTMCNC: 29.4 PG — SIGNIFICANT CHANGE UP (ref 27–34)
MCHC RBC-ENTMCNC: 32.4 GM/DL — SIGNIFICANT CHANGE UP (ref 32–36)
MCV RBC AUTO: 90.6 FL — SIGNIFICANT CHANGE UP (ref 80–100)
PLATELET # BLD AUTO: 202 K/UL — SIGNIFICANT CHANGE UP (ref 150–400)
POTASSIUM SERPL-MCNC: 3.7 MMOL/L — SIGNIFICANT CHANGE UP (ref 3.5–5.3)
POTASSIUM SERPL-SCNC: 3.7 MMOL/L — SIGNIFICANT CHANGE UP (ref 3.5–5.3)
PROT SERPL-MCNC: 5.9 GM/DL — LOW (ref 6–8.3)
PROTHROM AB SERPL-ACNC: 13.1 SEC — SIGNIFICANT CHANGE UP (ref 10.6–13.6)
RBC # BLD: 4.05 M/UL — SIGNIFICANT CHANGE UP (ref 3.8–5.2)
RBC # FLD: 11.9 % — SIGNIFICANT CHANGE UP (ref 10.3–14.5)
SARS-COV-2 IGG SERPL QL IA: NEGATIVE — SIGNIFICANT CHANGE UP
SARS-COV-2 IGM SERPL IA-ACNC: <0.1 INDEX — SIGNIFICANT CHANGE UP
SODIUM SERPL-SCNC: 138 MMOL/L — SIGNIFICANT CHANGE UP (ref 135–145)
SPECIMEN SOURCE: SIGNIFICANT CHANGE UP
TROPONIN I SERPL-MCNC: <0.015 NG/ML — SIGNIFICANT CHANGE UP (ref 0.01–0.04)
WBC # BLD: 13.23 K/UL — HIGH (ref 3.8–10.5)
WBC # FLD AUTO: 13.23 K/UL — HIGH (ref 3.8–10.5)

## 2020-08-26 PROCEDURE — 99232 SBSQ HOSP IP/OBS MODERATE 35: CPT

## 2020-08-26 PROCEDURE — 93010 ELECTROCARDIOGRAM REPORT: CPT

## 2020-08-26 PROCEDURE — 74019 RADEX ABDOMEN 2 VIEWS: CPT | Mod: 26

## 2020-08-26 PROCEDURE — 74177 CT ABD & PELVIS W/CONTRAST: CPT | Mod: 26

## 2020-08-26 RX ORDER — MAGNESIUM SULFATE 500 MG/ML
1 VIAL (ML) INJECTION ONCE
Refills: 0 | Status: COMPLETED | OUTPATIENT
Start: 2020-08-26 | End: 2020-08-26

## 2020-08-26 RX ORDER — ACETAMINOPHEN 500 MG
650 TABLET ORAL EVERY 6 HOURS
Refills: 0 | Status: DISCONTINUED | OUTPATIENT
Start: 2020-08-26 | End: 2020-08-27

## 2020-08-26 RX ORDER — ACETAMINOPHEN 500 MG
1000 TABLET ORAL ONCE
Refills: 0 | Status: COMPLETED | OUTPATIENT
Start: 2020-08-26 | End: 2020-08-26

## 2020-08-26 RX ORDER — SODIUM CHLORIDE 9 MG/ML
1000 INJECTION, SOLUTION INTRAVENOUS ONCE
Refills: 0 | Status: COMPLETED | OUTPATIENT
Start: 2020-08-26 | End: 2020-08-26

## 2020-08-26 RX ORDER — POTASSIUM PHOSPHATE, MONOBASIC POTASSIUM PHOSPHATE, DIBASIC 236; 224 MG/ML; MG/ML
15 INJECTION, SOLUTION INTRAVENOUS ONCE
Refills: 0 | Status: COMPLETED | OUTPATIENT
Start: 2020-08-26 | End: 2020-08-26

## 2020-08-26 RX ADMIN — Medication 15 MILLIGRAM(S): at 22:18

## 2020-08-26 RX ADMIN — PANTOPRAZOLE SODIUM 40 MILLIGRAM(S): 20 TABLET, DELAYED RELEASE ORAL at 10:49

## 2020-08-26 RX ADMIN — MORPHINE SULFATE 2 MILLIGRAM(S): 50 CAPSULE, EXTENDED RELEASE ORAL at 08:36

## 2020-08-26 RX ADMIN — Medication 15 MILLIGRAM(S): at 01:12

## 2020-08-26 RX ADMIN — MORPHINE SULFATE 2 MILLIGRAM(S): 50 CAPSULE, EXTENDED RELEASE ORAL at 10:00

## 2020-08-26 RX ADMIN — Medication 15 MILLIGRAM(S): at 13:56

## 2020-08-26 RX ADMIN — Medication 1 PATCH: at 13:55

## 2020-08-26 RX ADMIN — Medication 15 MILLIGRAM(S): at 10:30

## 2020-08-26 RX ADMIN — ONDANSETRON 4 MILLIGRAM(S): 8 TABLET, FILM COATED ORAL at 19:28

## 2020-08-26 RX ADMIN — Medication 100 MILLIGRAM(S): at 21:28

## 2020-08-26 RX ADMIN — Medication 15 MILLIGRAM(S): at 23:10

## 2020-08-26 RX ADMIN — SODIUM CHLORIDE 2000 MILLILITER(S): 9 INJECTION, SOLUTION INTRAVENOUS at 06:17

## 2020-08-26 RX ADMIN — ONDANSETRON 4 MILLIGRAM(S): 8 TABLET, FILM COATED ORAL at 04:11

## 2020-08-26 RX ADMIN — Medication 200 MILLIGRAM(S): at 06:16

## 2020-08-26 RX ADMIN — Medication 650 MILLIGRAM(S): at 04:26

## 2020-08-26 RX ADMIN — Medication 100 MILLIGRAM(S): at 13:41

## 2020-08-26 RX ADMIN — HEPARIN SODIUM 5000 UNIT(S): 5000 INJECTION INTRAVENOUS; SUBCUTANEOUS at 13:41

## 2020-08-26 RX ADMIN — HEPARIN SODIUM 5000 UNIT(S): 5000 INJECTION INTRAVENOUS; SUBCUTANEOUS at 05:06

## 2020-08-26 RX ADMIN — SODIUM CHLORIDE 100 MILLILITER(S): 9 INJECTION, SOLUTION INTRAVENOUS at 05:06

## 2020-08-26 RX ADMIN — POTASSIUM PHOSPHATE, MONOBASIC POTASSIUM PHOSPHATE, DIBASIC 63.75 MILLIMOLE(S): 236; 224 INJECTION, SOLUTION INTRAVENOUS at 13:41

## 2020-08-26 RX ADMIN — Medication 1 PATCH: at 10:39

## 2020-08-26 RX ADMIN — HEPARIN SODIUM 5000 UNIT(S): 5000 INJECTION INTRAVENOUS; SUBCUTANEOUS at 21:28

## 2020-08-26 RX ADMIN — Medication 100 GRAM(S): at 10:38

## 2020-08-26 RX ADMIN — Medication 100 MILLIGRAM(S): at 05:06

## 2020-08-26 RX ADMIN — Medication 400 MILLIGRAM(S): at 14:27

## 2020-08-26 RX ADMIN — Medication 200 MILLIGRAM(S): at 22:36

## 2020-08-26 NOTE — PROGRESS NOTE ADULT - ASSESSMENT
44 year old female with first episode of complicated diverticulitis who has had some clinical worsening overnight with tachycardia and fevers. She is somewhat better this morning in terms of vitals as HR is now normal. Her abdominal exam is however worse than yesterday when evaluated in ED. Other objective information is stable WBC and normal lactate. There is no free air on xray. I recommend repeating CT scan to see if there is an abscess that can be drained. Otherwise, I discussed surgery in the form of exploration with sigmoidectomy and colostomy. Risks and benefits of surgery was reviewed. It includes but not limited to cardiopulmonary complications, bleeding, infection, injury to intraabdominal structures including ureter, leak, sepsis, multiorgan failure, death. She understands surgery may occur today pending repeat scan and clinical status.

## 2020-08-26 NOTE — CHART NOTE - NSCHARTNOTEFT_GEN_A_CORE
Code Sepsis called    43 y/o female no pmhx presented to ED w/ abdominal pain. Admitted w/ sigmoid diverticulitis w/ a localized perforation. Admitted to med/surg, treated w/ IVF and started on cipro and flagyl. This am noted to be tachycardic, febrile w/ abdominal and chest pain.     Vital signs  BP :   106/65  HR: 113  O2 sat 96% on room air  Temp 101.8    Recommend:  - Sepsis fluid resuscitation w/ 30cc/kg IVF   - Send STAT labs Lactate/ BMP/Mg/phos/CBC/ trop   - EKG done at rapid w/ sinus tachycardia.  No obvious ST segment changes.   - Blood cultures are pending.   - On Falgyl and cipro.   - Pain control     rest of plan per surgery.   Discussed w/ surgery and family medicine residents.

## 2020-08-26 NOTE — PROVIDER CONTACT NOTE (OTHER) - SITUATION
Patient observed to have increased heart rate (112) with temp of 101.8 orally. New onset of chest pain as per patient.

## 2020-08-26 NOTE — PROGRESS NOTE ADULT - SUBJECTIVE AND OBJECTIVE BOX
Early am events noted. Code sepsis called for fever, worsening abdominal pain and tachycardia. She was complaining of some chest pain as well. On my evaluation, she complains of bloating but no more chest pain. She had received pain meds which were helping. No bowel function and feels bloated.     Exam:  Vital Signs Last 24 Hrs  T(C): 36.8 (26 Aug 2020 07:53), Max: 38.8 (26 Aug 2020 05:42)  T(F): 98.3 (26 Aug 2020 07:53), Max: 101.8 (26 Aug 2020 05:42)  HR: 80 (26 Aug 2020 07:53) (64 - 112)  BP: 100/66 (26 Aug 2020 07:53) (100/59 - 114/67)  BP(mean): 70 (25 Aug 2020 19:52) (70 - 85)  RR: 18 (26 Aug 2020 07:53) (18 - 18)  SpO2: 95% (26 Aug 2020 07:53) (94% - 100%)    General: was resting comfortable  Respiratory: non labored breathing  Abdomen: soft, distended, tender diffusely but worse in LLQ   Neuro: alert and oriented x 3                          11.9   13.23 )-----------( 202      ( 26 Aug 2020 06:45 )             36.7   08-26    138  |  106  |  8   ----------------------------<  92  3.7   |  24  |  0.74    Ca    7.8<L>      26 Aug 2020 06:45  Phos  1.5     08-26  Mg     1.3     08-26    TPro  5.9<L>  /  Alb  2.8<L>  /  TBili  0.7  /  DBili  x   /  AST  13<L>  /  ALT  9<L>  /  AlkPhos  38<L>  08-26    Lactate 1.2    Abdominal xray: no free air

## 2020-08-27 ENCOUNTER — RESULT REVIEW (OUTPATIENT)
Age: 45
End: 2020-08-27

## 2020-08-27 LAB
ALBUMIN SERPL ELPH-MCNC: 2.4 G/DL — LOW (ref 3.3–5)
ALP SERPL-CCNC: 33 U/L — LOW (ref 40–120)
ALT FLD-CCNC: 7 U/L — LOW (ref 12–78)
ANION GAP SERPL CALC-SCNC: 8 MMOL/L — SIGNIFICANT CHANGE UP (ref 5–17)
ANION GAP SERPL CALC-SCNC: 9 MMOL/L — SIGNIFICANT CHANGE UP (ref 5–17)
AST SERPL-CCNC: 9 U/L — LOW (ref 15–37)
BILIRUB SERPL-MCNC: 0.5 MG/DL — SIGNIFICANT CHANGE UP (ref 0.2–1.2)
BUN SERPL-MCNC: 10 MG/DL — SIGNIFICANT CHANGE UP (ref 7–23)
BUN SERPL-MCNC: 9 MG/DL — SIGNIFICANT CHANGE UP (ref 7–23)
CALCIUM SERPL-MCNC: 8.3 MG/DL — LOW (ref 8.5–10.1)
CALCIUM SERPL-MCNC: 8.5 MG/DL — SIGNIFICANT CHANGE UP (ref 8.5–10.1)
CHLORIDE SERPL-SCNC: 105 MMOL/L — SIGNIFICANT CHANGE UP (ref 96–108)
CHLORIDE SERPL-SCNC: 106 MMOL/L — SIGNIFICANT CHANGE UP (ref 96–108)
CO2 SERPL-SCNC: 22 MMOL/L — SIGNIFICANT CHANGE UP (ref 22–31)
CO2 SERPL-SCNC: 24 MMOL/L — SIGNIFICANT CHANGE UP (ref 22–31)
CREAT SERPL-MCNC: 0.67 MG/DL — SIGNIFICANT CHANGE UP (ref 0.5–1.3)
CREAT SERPL-MCNC: 0.7 MG/DL — SIGNIFICANT CHANGE UP (ref 0.5–1.3)
GLUCOSE SERPL-MCNC: 83 MG/DL — SIGNIFICANT CHANGE UP (ref 70–99)
GLUCOSE SERPL-MCNC: 85 MG/DL — SIGNIFICANT CHANGE UP (ref 70–99)
HCT VFR BLD CALC: 32.4 % — LOW (ref 34.5–45)
HCT VFR BLD CALC: 34.6 % — SIGNIFICANT CHANGE UP (ref 34.5–45)
HGB BLD-MCNC: 10.8 G/DL — LOW (ref 11.5–15.5)
HGB BLD-MCNC: 11.4 G/DL — LOW (ref 11.5–15.5)
LACTATE SERPL-SCNC: 1.2 MMOL/L — SIGNIFICANT CHANGE UP (ref 0.7–2)
MAGNESIUM SERPL-MCNC: 1.7 MG/DL — SIGNIFICANT CHANGE UP (ref 1.6–2.6)
MAGNESIUM SERPL-MCNC: 1.8 MG/DL — SIGNIFICANT CHANGE UP (ref 1.6–2.6)
MCHC RBC-ENTMCNC: 30 PG — SIGNIFICANT CHANGE UP (ref 27–34)
MCHC RBC-ENTMCNC: 30.2 PG — SIGNIFICANT CHANGE UP (ref 27–34)
MCHC RBC-ENTMCNC: 32.9 GM/DL — SIGNIFICANT CHANGE UP (ref 32–36)
MCHC RBC-ENTMCNC: 33.3 GM/DL — SIGNIFICANT CHANGE UP (ref 32–36)
MCV RBC AUTO: 90.5 FL — SIGNIFICANT CHANGE UP (ref 80–100)
MCV RBC AUTO: 91.1 FL — SIGNIFICANT CHANGE UP (ref 80–100)
PHOSPHATE SERPL-MCNC: 1.7 MG/DL — LOW (ref 2.5–4.5)
PHOSPHATE SERPL-MCNC: 2.3 MG/DL — LOW (ref 2.5–4.5)
PLATELET # BLD AUTO: 186 K/UL — SIGNIFICANT CHANGE UP (ref 150–400)
PLATELET # BLD AUTO: 196 K/UL — SIGNIFICANT CHANGE UP (ref 150–400)
POTASSIUM SERPL-MCNC: 3.5 MMOL/L — SIGNIFICANT CHANGE UP (ref 3.5–5.3)
POTASSIUM SERPL-MCNC: 3.7 MMOL/L — SIGNIFICANT CHANGE UP (ref 3.5–5.3)
POTASSIUM SERPL-SCNC: 3.5 MMOL/L — SIGNIFICANT CHANGE UP (ref 3.5–5.3)
POTASSIUM SERPL-SCNC: 3.7 MMOL/L — SIGNIFICANT CHANGE UP (ref 3.5–5.3)
PROT SERPL-MCNC: 5.6 GM/DL — LOW (ref 6–8.3)
RBC # BLD: 3.58 M/UL — LOW (ref 3.8–5.2)
RBC # BLD: 3.8 M/UL — SIGNIFICANT CHANGE UP (ref 3.8–5.2)
RBC # FLD: 12 % — SIGNIFICANT CHANGE UP (ref 10.3–14.5)
RBC # FLD: 12.2 % — SIGNIFICANT CHANGE UP (ref 10.3–14.5)
SODIUM SERPL-SCNC: 137 MMOL/L — SIGNIFICANT CHANGE UP (ref 135–145)
SODIUM SERPL-SCNC: 137 MMOL/L — SIGNIFICANT CHANGE UP (ref 135–145)
WBC # BLD: 21.52 K/UL — HIGH (ref 3.8–10.5)
WBC # BLD: 22.27 K/UL — HIGH (ref 3.8–10.5)
WBC # FLD AUTO: 21.52 K/UL — HIGH (ref 3.8–10.5)
WBC # FLD AUTO: 22.27 K/UL — HIGH (ref 3.8–10.5)

## 2020-08-27 PROCEDURE — 44143 PARTIAL REMOVAL OF COLON: CPT

## 2020-08-27 PROCEDURE — 88307 TISSUE EXAM BY PATHOLOGIST: CPT | Mod: 26

## 2020-08-27 PROCEDURE — 99233 SBSQ HOSP IP/OBS HIGH 50: CPT | Mod: 57

## 2020-08-27 RX ORDER — HYDROMORPHONE HYDROCHLORIDE 2 MG/ML
1 INJECTION INTRAMUSCULAR; INTRAVENOUS; SUBCUTANEOUS
Refills: 0 | Status: DISCONTINUED | OUTPATIENT
Start: 2020-08-27 | End: 2020-08-28

## 2020-08-27 RX ORDER — CYCLOBENZAPRINE HYDROCHLORIDE 10 MG/1
5 TABLET, FILM COATED ORAL ONCE
Refills: 0 | Status: COMPLETED | OUTPATIENT
Start: 2020-08-27 | End: 2020-08-27

## 2020-08-27 RX ORDER — SODIUM,POTASSIUM PHOSPHATES 278-250MG
1 POWDER IN PACKET (EA) ORAL EVERY 6 HOURS
Refills: 0 | Status: DISCONTINUED | OUTPATIENT
Start: 2020-08-27 | End: 2020-08-27

## 2020-08-27 RX ORDER — ONDANSETRON 8 MG/1
4 TABLET, FILM COATED ORAL EVERY 6 HOURS
Refills: 0 | Status: DISCONTINUED | OUTPATIENT
Start: 2020-08-27 | End: 2020-09-01

## 2020-08-27 RX ORDER — FENTANYL CITRATE 50 UG/ML
25 INJECTION INTRAVENOUS
Refills: 0 | Status: DISCONTINUED | OUTPATIENT
Start: 2020-08-27 | End: 2020-08-28

## 2020-08-27 RX ORDER — METRONIDAZOLE 500 MG
500 TABLET ORAL EVERY 8 HOURS
Refills: 0 | Status: DISCONTINUED | OUTPATIENT
Start: 2020-08-27 | End: 2020-08-31

## 2020-08-27 RX ORDER — NALOXONE HYDROCHLORIDE 4 MG/.1ML
0.1 SPRAY NASAL
Refills: 0 | Status: DISCONTINUED | OUTPATIENT
Start: 2020-08-27 | End: 2020-09-01

## 2020-08-27 RX ORDER — POTASSIUM PHOSPHATE, MONOBASIC POTASSIUM PHOSPHATE, DIBASIC 236; 224 MG/ML; MG/ML
15 INJECTION, SOLUTION INTRAVENOUS ONCE
Refills: 0 | Status: COMPLETED | OUTPATIENT
Start: 2020-08-27 | End: 2020-08-27

## 2020-08-27 RX ORDER — FAMOTIDINE 10 MG/ML
20 INJECTION INTRAVENOUS DAILY
Refills: 0 | Status: DISCONTINUED | OUTPATIENT
Start: 2020-08-27 | End: 2020-08-28

## 2020-08-27 RX ORDER — HYDROMORPHONE HYDROCHLORIDE 2 MG/ML
30 INJECTION INTRAMUSCULAR; INTRAVENOUS; SUBCUTANEOUS
Refills: 0 | Status: DISCONTINUED | OUTPATIENT
Start: 2020-08-27 | End: 2020-08-30

## 2020-08-27 RX ORDER — HEPARIN SODIUM 5000 [USP'U]/ML
5000 INJECTION INTRAVENOUS; SUBCUTANEOUS EVERY 8 HOURS
Refills: 0 | Status: DISCONTINUED | OUTPATIENT
Start: 2020-08-27 | End: 2020-09-01

## 2020-08-27 RX ORDER — CIPROFLOXACIN LACTATE 400MG/40ML
400 VIAL (ML) INTRAVENOUS EVERY 12 HOURS
Refills: 0 | Status: DISCONTINUED | OUTPATIENT
Start: 2020-08-27 | End: 2020-08-31

## 2020-08-27 RX ORDER — SODIUM CHLORIDE 9 MG/ML
1000 INJECTION, SOLUTION INTRAVENOUS
Refills: 0 | Status: DISCONTINUED | OUTPATIENT
Start: 2020-08-27 | End: 2020-08-28

## 2020-08-27 RX ADMIN — Medication 200 MILLIGRAM(S): at 20:20

## 2020-08-27 RX ADMIN — HYDROMORPHONE HYDROCHLORIDE 1 MILLIGRAM(S): 2 INJECTION INTRAMUSCULAR; INTRAVENOUS; SUBCUTANEOUS at 23:50

## 2020-08-27 RX ADMIN — Medication 1 PATCH: at 17:22

## 2020-08-27 RX ADMIN — SODIUM CHLORIDE 100 MILLILITER(S): 9 INJECTION, SOLUTION INTRAVENOUS at 12:44

## 2020-08-27 RX ADMIN — Medication 100 MILLIGRAM(S): at 05:17

## 2020-08-27 RX ADMIN — CYCLOBENZAPRINE HYDROCHLORIDE 5 MILLIGRAM(S): 10 TABLET, FILM COATED ORAL at 17:21

## 2020-08-27 RX ADMIN — HEPARIN SODIUM 5000 UNIT(S): 5000 INJECTION INTRAVENOUS; SUBCUTANEOUS at 14:57

## 2020-08-27 RX ADMIN — Medication 650 MILLIGRAM(S): at 08:36

## 2020-08-27 RX ADMIN — Medication 15 MILLIGRAM(S): at 10:45

## 2020-08-27 RX ADMIN — Medication 1 PATCH: at 07:27

## 2020-08-27 RX ADMIN — Medication 100 MILLIGRAM(S): at 14:56

## 2020-08-27 RX ADMIN — ONDANSETRON 4 MILLIGRAM(S): 8 TABLET, FILM COATED ORAL at 16:09

## 2020-08-27 RX ADMIN — Medication 200 MILLIGRAM(S): at 10:26

## 2020-08-27 RX ADMIN — Medication 1 PATCH: at 10:27

## 2020-08-27 RX ADMIN — Medication 650 MILLIGRAM(S): at 09:00

## 2020-08-27 RX ADMIN — Medication 15 MILLIGRAM(S): at 10:32

## 2020-08-27 RX ADMIN — Medication 1 PATCH: at 10:26

## 2020-08-27 RX ADMIN — Medication 1 PATCH: at 05:17

## 2020-08-27 RX ADMIN — POTASSIUM PHOSPHATE, MONOBASIC POTASSIUM PHOSPHATE, DIBASIC 63.75 MILLIMOLE(S): 236; 224 INJECTION, SOLUTION INTRAVENOUS at 12:45

## 2020-08-27 RX ADMIN — PANTOPRAZOLE SODIUM 40 MILLIGRAM(S): 20 TABLET, DELAYED RELEASE ORAL at 10:25

## 2020-08-27 RX ADMIN — HEPARIN SODIUM 5000 UNIT(S): 5000 INJECTION INTRAVENOUS; SUBCUTANEOUS at 05:16

## 2020-08-27 RX ADMIN — Medication 100 MILLIGRAM(S): at 20:10

## 2020-08-27 NOTE — PROGRESS NOTE ADULT - SUBJECTIVE AND OBJECTIVE BOX
Pt was seen and examined at bedside this morning with colorectal surgery team. No acute overnight events reported by nursing staff. Pt states pain has improved since prior clinical exam. Pt offers no other complaints at this time. Denies fever/cp/sob/n/v/d/c. VSS.     PHYSICAL EXAM:  Constitutional: NAD, GCS: 15/15  AOX3  Eyes:  WNL  ENMT:  WNL  Neck:  WNL, non tender  Back: Non tender  Respiratory: CTABL  Cardiovascular:  S1+S2+0  Gastrointestinal: Soft, ND , NT  Genitourinary:  WNL  Extremities: NV intact  Vascular:  Intact  Neurological: No focal neurological deficit,  CN, motor and sensory system grossly intact.  Skin: WNL  Musculoskeletal: WNL  Psychiatric: Grossly WNL                          10.8   21.52 )-----------( 186      ( 27 Aug 2020 07:43 )             32.4     08-26    138  |  106  |  8   ----------------------------<  92  3.7   |  24  |  0.74    Ca    7.8<L>      26 Aug 2020 06:45  Phos  1.5     08-26  Mg     1.3     08-26    TPro  5.9<L>  /  Alb  2.8<L>  /  TBili  0.7  /  DBili  x   /  AST  13<L>  /  ALT  9<L>  /  AlkPhos  38<L>  08-26    General: was resting comfortable  Respiratory: non labored breathing  Abdomen: soft, distended, tender diffusely but worse in LLQ   Neuro: alert and oriented x 3                          11.9   13.23 )-----------( 202      ( 26 Aug 2020 06:45 )             36.7   08-26    138  |  106  |  8   ----------------------------<  92  3.7   |  24  |  0.74    Ca    7.8<L>      26 Aug 2020 06:45  Phos  1.5     08-26  Mg     1.3     08-26    TPro  5.9<L>  /  Alb  2.8<L>  /  TBili  0.7  /  DBili  x   /  AST  13<L>  /  ALT  9<L>  /  AlkPhos  38<L>  08-26    Lactate 1.2    Abdominal xray: no free air

## 2020-08-27 NOTE — PROGRESS NOTE ADULT - ASSESSMENT
Pt is a 44 year old female with first episode of diverticulitis, conservative mgt at this time, clinical exam improving     -Monitor vitals  -Diet: ADAT  -Pain control prn  -Anti emetic prn  -Serial abd exams  -Encourage oob/ambulation  -DVT ppx    Will discuss plan with Dr. Oh

## 2020-08-27 NOTE — PROGRESS NOTE ADULT - ATTENDING COMMENTS
Pt seen and examined @ 10AM and again at 4PM today.   Agree with assessment and plan per DR. Billingsley    Pt was doing well this morning reporting less pain, however after starting clears, she complained of singificantly worsened upper abdominal pain.    WBC this morning was 21K. repeat in the afternoon was 23K.  Exam in the morning showed mild epigastric tenderness to palpaition but this worsened this afternoon with voluntary guarding.    labs reviewed    imp: perforated diverticulitis  --given worsening physical exam, would recommend urgent exploratory laparotomy with possible bowel resection, possible ostomy.  Risks include bleeding, infection, anastomotic leak, injury bowel/bladder/ureter/liver/spleen, DVT/PE, cardiopulmonary failure, death, future hernia, bowel obstruction discussed.  Alternatives including continued observation discussed.  Questions answered.  Pt consents.  Will proceed.

## 2020-08-27 NOTE — CHART NOTE - NSCHARTNOTEFT_GEN_A_CORE
I am having technical difficulties with dictation system at this time.  Operative note entered in this field in order to avoid significant delay in entry into patient chart.  I will transcribe and dictate the following note to the transcription system technical difficulties are overcome.    Maria Teresa Bowers  MRN [id number]  Date of Service 8/27/2020    Preop diagnosis: Perforated sigmoid diverticulitis  Postop Diagnosis:  same  Procedure performed:  Exploratory laparotomy, Sigmoid resection with end colostomy 33944  ASA 2E  Wound class IV  Surgeon:  Santiago Zavala MD  Resident assistants: Fazal Mayorga MD PGY5  		August Gaitan MD PGY2  Anesthesia:  General endotracheal Medhat Mirza MD    Indications:  45 YO F admitted with her first episode of sigmoid diverticulitis.  Initial workup remarkable for CT scan demonstrating sigmoid diverticulitis with localized perforation.  She had acute worsening of her pain and tachycardia the next day but subsequently repeat CT scan demonstrating pelvic abscess that were too small to drain per IR.  Her pain actually initially improved the following morning, but she experienced severe exacerbation of her abdominal pain later that day with persistent worsening of leukocytosis.  Given her hospital course with deterioration, there was concern for peritonitis.  Exploratory laparotomy with possible bowel resection, possible ostomy was recommended.  Risks including bleeding, infection, anastomotic leak, injury to bowel/bladder/liver/spleen, hernia, cardiopulmonary failure, and death were discussed.  Pt provided consent.  Findings:  Hinchey class 3 sigmoid diverticulitis with findings of free intra-peritoneal air and pus.  2mm pinhole identified in mid rectum.  Free pus in pelvis involving the cul-de-sac.  Procedure:  patient was brought to the OR and placed on the operating table in lithotomy position in yellowfin stirrups.  Arms and legs were padded and positioned appropriately.  After induction of general anesthesia, a bhagat was inserted and patient was given her scheduled evening dose of ciprofloxacin and metronidazole.  A time out was performed and patient’s identification and procedure were confirmed.  Midline incision was made with 10-blade on the skin and electrocautery through the subcutaneous tissues to expose the fascia.  The fascia was then elevate between kocher clamps and incised with fresh 10-blade.  With exposure of peritoneum through the preperitoneal fat, it was evidence there was a moderate amount of free air within the peritoneal cavity.  The peritoneum was incised with Metzenbaum scissors.  Upon entry into the peritoneal cavity, there was some pus, but no artemio stool.  A culture stick was obtained on the fluid and routine culture and gram stains were sent.  Palpation of the abdominal viscera suggested phlegmon and diverticulitis in the pelvis.  A Bookwalter retractor system was used to provide exposure.  The small bowel was inspected from ligament of treiz to the ileocecal valve and appeared normal.   The stomach also appeared normal.  The ascending, transverse, and descending colon was normal.  However the mid to distal sigmoid colon appeared inflamed.  The appendices epipolica were inflamed and contaminated with fibrinous debris in this area.  A knuckle of the sigmoid colon appeared to be stuck in the cul-de-sac.  This surrounded a presumed abscess cavity with the cul-de-sac.  We started by packing the small bowel and proximal colon into the right upper quadrant.  The sigmoid colon was dissected off the pelvic side wall with electrocautery and we continued this dissection down the left side of the pelvis down to the above stuck area adjacent to the pelvic abscess cavity.  We were able to identify the left ureter and left iliac vessels and avoid injury to them.  After this dissection, this allowed us to straighten out the sigmoid colon and observe that the rectum was not involved.  Next, I placed anterior traction on the freed up sigmoid colon in order to put tension on the mesentery and accentuate appearance of the DEWAYNE/sigmoid/superior rectal artery complex within the mesentery.  We skeletonized the location of the DEWAYNE just proximal to the confluence of the sigmoid and superior rectal arteries.  We again identified the left ureter from this perspective, then proceeded to divide the artery between Marlene clamps and suture ligate the ends with 2-0 vicryl suture.  After dividing the artery, this allowed us to the dissect the mesentery of the sigmoid colon distally to enter the retrorectal space between the mesorectum and sacral promontory.  We also used the Ligasure Imact to divide the mesentery from the arterial ligation site described above up to the proximal sigmoid colon which appeared normal.  The colon was transect at this location with a single firing of the Contour 40 stapler with a green load.  The proximal colon was the packed into the LUQ and then we turned our attention to the sigmoid colon.  I scored the R side of the mesorectum from the sacral promontory down to the cul-de-sac.  We then mobilized the upper rectum posteriorly then laterally.  We identified the location in the upper rectum which was devoid of appendices epiploica as well as splaying of the tinea.  A second firing of the Contour stapler with a new green load allowed the specimen to be passed off the field.  We then irrigated the peritoneal cavity and pelvis with 2 liters of normal saline.  Examination of the rectal stump revealed a lot of fibrinous debris adherent to the bowel wall and peritoneum covering the cul-de-sac.  Given this appearance, I was concerned that performing a colorectal anastomosis now would lead to a signficant leak.  We decided to move forward with a colostomy (Jason’s procedure). 4 liters of warm saline was then used to irrigate out the pelvis and peritoneal cavity.  I then mobilized the descending colon laterally and posteriorly from the retroperitoneum.  This gave us enough length in the descending colon to reach our measured possible stoma sites on her abdomen.  A small ellipse of skin was excised at site of proposed stoma site in the LUQ.  Longitudinal dissection with electrocautery was completed through the subcutaneous tissues to expose the rectus sheath.  A longitudinal incision was then made in the anterior sheath and the rectus muscle was bluntly split to reveal the posterior sheath with was entered sharply with electrocautery.  The palm of my had was placed on the peritoneal side to protect underlying viscera.    A maximiliano was the inserted through the newly created aperture. And the end of the descending colon was then brought through the stoma aperture with the maximiliano, taking care not to twist it about its mesenteric axis.   Then, the abdominal fascia was closed with running #1 PDS looped suture.  Skin was reapproximated with skin staples.  This was covered with a towel then we turned our attention to the colostomy.  A 1cm beti was performed at stoma site and 3-0 vicryl sutures were used to secure it to the skin. Afterwards stoma appliance was applied.  Patient was then awakened from anesthesia and extubated and taken to recovery room in stable condition.    Specimens include rectosigmoid colon and peritoneal fluid for culture and gram stain      Crystalloid 2000mL  Uo 500mL  Sponge, needle, instrument counts were correct at end of case.  There was no immediate complications

## 2020-08-28 LAB
ALBUMIN SERPL ELPH-MCNC: 1.9 G/DL — LOW (ref 3.3–5)
ALP SERPL-CCNC: 33 U/L — LOW (ref 40–120)
ALT FLD-CCNC: 9 U/L — LOW (ref 12–78)
ANION GAP SERPL CALC-SCNC: 5 MMOL/L — SIGNIFICANT CHANGE UP (ref 5–17)
AST SERPL-CCNC: 23 U/L — SIGNIFICANT CHANGE UP (ref 15–37)
BILIRUB SERPL-MCNC: 0.3 MG/DL — SIGNIFICANT CHANGE UP (ref 0.2–1.2)
BUN SERPL-MCNC: 8 MG/DL — SIGNIFICANT CHANGE UP (ref 7–23)
CALCIUM SERPL-MCNC: 7.8 MG/DL — LOW (ref 8.5–10.1)
CHLORIDE SERPL-SCNC: 106 MMOL/L — SIGNIFICANT CHANGE UP (ref 96–108)
CO2 SERPL-SCNC: 25 MMOL/L — SIGNIFICANT CHANGE UP (ref 22–31)
CREAT SERPL-MCNC: 0.7 MG/DL — SIGNIFICANT CHANGE UP (ref 0.5–1.3)
GLUCOSE SERPL-MCNC: 139 MG/DL — HIGH (ref 70–99)
HCT VFR BLD CALC: 34.8 % — SIGNIFICANT CHANGE UP (ref 34.5–45)
HGB BLD-MCNC: 11.5 G/DL — SIGNIFICANT CHANGE UP (ref 11.5–15.5)
MAGNESIUM SERPL-MCNC: 1.7 MG/DL — SIGNIFICANT CHANGE UP (ref 1.6–2.6)
MCHC RBC-ENTMCNC: 29.8 PG — SIGNIFICANT CHANGE UP (ref 27–34)
MCHC RBC-ENTMCNC: 33 GM/DL — SIGNIFICANT CHANGE UP (ref 32–36)
MCV RBC AUTO: 90.2 FL — SIGNIFICANT CHANGE UP (ref 80–100)
PHOSPHATE SERPL-MCNC: 2.2 MG/DL — LOW (ref 2.5–4.5)
PLATELET # BLD AUTO: 192 K/UL — SIGNIFICANT CHANGE UP (ref 150–400)
POTASSIUM SERPL-MCNC: 3.8 MMOL/L — SIGNIFICANT CHANGE UP (ref 3.5–5.3)
POTASSIUM SERPL-SCNC: 3.8 MMOL/L — SIGNIFICANT CHANGE UP (ref 3.5–5.3)
PROT SERPL-MCNC: 5.2 GM/DL — LOW (ref 6–8.3)
RBC # BLD: 3.86 M/UL — SIGNIFICANT CHANGE UP (ref 3.8–5.2)
RBC # FLD: 12.4 % — SIGNIFICANT CHANGE UP (ref 10.3–14.5)
SODIUM SERPL-SCNC: 136 MMOL/L — SIGNIFICANT CHANGE UP (ref 135–145)
WBC # BLD: 21.19 K/UL — HIGH (ref 3.8–10.5)
WBC # FLD AUTO: 21.19 K/UL — HIGH (ref 3.8–10.5)

## 2020-08-28 RX ORDER — SODIUM CHLORIDE 9 MG/ML
1000 INJECTION, SOLUTION INTRAVENOUS ONCE
Refills: 0 | Status: COMPLETED | OUTPATIENT
Start: 2020-08-28 | End: 2020-08-28

## 2020-08-28 RX ORDER — FAMOTIDINE 10 MG/ML
20 INJECTION INTRAVENOUS DAILY
Refills: 0 | Status: DISCONTINUED | OUTPATIENT
Start: 2020-08-28 | End: 2020-09-01

## 2020-08-28 RX ORDER — SODIUM CHLORIDE 9 MG/ML
1000 INJECTION, SOLUTION INTRAVENOUS
Refills: 0 | Status: DISCONTINUED | OUTPATIENT
Start: 2020-08-28 | End: 2020-08-29

## 2020-08-28 RX ADMIN — SODIUM CHLORIDE 100 MILLILITER(S): 9 INJECTION, SOLUTION INTRAVENOUS at 16:57

## 2020-08-28 RX ADMIN — FAMOTIDINE 20 MILLIGRAM(S): 10 INJECTION INTRAVENOUS at 15:10

## 2020-08-28 RX ADMIN — Medication 62.5 MILLIMOLE(S): at 10:35

## 2020-08-28 RX ADMIN — Medication 200 MILLIGRAM(S): at 05:46

## 2020-08-28 RX ADMIN — SODIUM CHLORIDE 100 MILLILITER(S): 9 INJECTION, SOLUTION INTRAVENOUS at 08:19

## 2020-08-28 RX ADMIN — HYDROMORPHONE HYDROCHLORIDE 30 MILLILITER(S): 2 INJECTION INTRAMUSCULAR; INTRAVENOUS; SUBCUTANEOUS at 00:26

## 2020-08-28 RX ADMIN — SODIUM CHLORIDE 1000 MILLILITER(S): 9 INJECTION, SOLUTION INTRAVENOUS at 06:41

## 2020-08-28 RX ADMIN — HYDROMORPHONE HYDROCHLORIDE 1 MILLIGRAM(S): 2 INJECTION INTRAMUSCULAR; INTRAVENOUS; SUBCUTANEOUS at 00:03

## 2020-08-28 RX ADMIN — HYDROMORPHONE HYDROCHLORIDE 1 MILLIGRAM(S): 2 INJECTION INTRAMUSCULAR; INTRAVENOUS; SUBCUTANEOUS at 00:07

## 2020-08-28 RX ADMIN — HYDROMORPHONE HYDROCHLORIDE 1 MILLIGRAM(S): 2 INJECTION INTRAMUSCULAR; INTRAVENOUS; SUBCUTANEOUS at 00:15

## 2020-08-28 RX ADMIN — Medication 200 MILLIGRAM(S): at 16:55

## 2020-08-28 RX ADMIN — Medication 100 MILLIGRAM(S): at 06:55

## 2020-08-28 RX ADMIN — Medication 100 MILLIGRAM(S): at 15:10

## 2020-08-28 RX ADMIN — HEPARIN SODIUM 5000 UNIT(S): 5000 INJECTION INTRAVENOUS; SUBCUTANEOUS at 15:10

## 2020-08-28 RX ADMIN — HEPARIN SODIUM 5000 UNIT(S): 5000 INJECTION INTRAVENOUS; SUBCUTANEOUS at 21:01

## 2020-08-28 RX ADMIN — Medication 100 MILLIGRAM(S): at 21:00

## 2020-08-28 NOTE — PROGRESS NOTE ADULT - ATTENDING COMMENTS
Pt seen and examined.  Agree with assessment and plan per Dr. House    Pt feels ok.  Pain under control on PCA.  No output per stoma yet    Vital Signs Last 24 Hrs  T(C): 36.4 (28 Aug 2020 11:41), Max: 37.4 (27 Aug 2020 15:42)  T(F): 97.5 (28 Aug 2020 11:41), Max: 99.3 (27 Aug 2020 15:42)  HR: 93 (28 Aug 2020 11:41) (74 - 107)  BP: 102/71 (28 Aug 2020 11:41) (95/63 - 116/73)  BP(mean): --  RR: 18 (28 Aug 2020 11:41) (8 - 18)  SpO2: 92% (28 Aug 2020 11:41) (92% - 97%)    Abd mild distension/soft/+ appropriate incisional tenderness, no rebound or guarding  stoma pink, no output                          11.5   21.19 )-----------( 192      ( 28 Aug 2020 07:44 )             34.8   08-28    136  |  106  |  8   ----------------------------<  139<H>  3.8   |  25  |  0.70    Ca    7.8<L>      28 Aug 2020 07:44  Phos  2.2     08-28  Mg     1.7     08-28    TPro  5.2<L>  /  Alb  1.9<L>  /  TBili  0.3  /  DBili  x   /  AST  23  /  ALT  9<L>  /  AlkPhos  33<L>  08-28 Pt seen and examined.  Agree with assessment and plan per Dr. House    Pt feels ok.  Pain under control on PCA.  No output per stoma yet    Vital Signs Last 24 Hrs  T(C): 36.4 (28 Aug 2020 11:41), Max: 37.4 (27 Aug 2020 15:42)  T(F): 97.5 (28 Aug 2020 11:41), Max: 99.3 (27 Aug 2020 15:42)  HR: 93 (28 Aug 2020 11:41) (74 - 107)  BP: 102/71 (28 Aug 2020 11:41) (95/63 - 116/73)  BP(mean): --  RR: 18 (28 Aug 2020 11:41) (8 - 18)  SpO2: 92% (28 Aug 2020 11:41) (92% - 97%)    /8h  NII  Abd mild distension/soft/+ appropriate incisional tenderness, no rebound or guarding  stoma pink, no output                          11.5   21.19 )-----------( 192      ( 28 Aug 2020 07:44 )             34.8   08-28    136  |  106  |  8   ----------------------------<  139<H>  3.8   |  25  |  0.70    Ca    7.8<L>      28 Aug 2020 07:44  Phos  2.2     08-28  Mg     1.7     08-28    TPro  5.2<L>  /  Alb  1.9<L>  /  TBili  0.3  /  DBili  x   /  AST  23  /  ALT  9<L>  /  AlkPhos  33<L>  08-28 Pt seen and examined.  Agree with assessment and plan per Dr. House    Pt feels ok.  Pain under control on PCA.  No output per stoma yet    Vital Signs Last 24 Hrs  T(C): 36.4 (28 Aug 2020 11:41), Max: 37.4 (27 Aug 2020 15:42)  T(F): 97.5 (28 Aug 2020 11:41), Max: 99.3 (27 Aug 2020 15:42)  HR: 93 (28 Aug 2020 11:41) (74 - 107)  BP: 102/71 (28 Aug 2020 11:41) (95/63 - 116/73)  BP(mean): --  RR: 18 (28 Aug 2020 11:41) (8 - 18)  SpO2: 92% (28 Aug 2020 11:41) (92% - 97%)    /8h  /8h serosanguinous    Abd mild distension/soft/+ appropriate incisional tenderness, no rebound or guarding  stoma pink, no output                          11.5   21.19 )-----------( 192      ( 28 Aug 2020 07:44 )             34.8   08-28    136  |  106  |  8   ----------------------------<  139<H>  3.8   |  25  |  0.70    Ca    7.8<L>      28 Aug 2020 07:44  Phos  2.2     08-28  Mg     1.7     08-28    TPro  5.2<L>  /  Alb  1.9<L>  /  TBili  0.3  /  DBili  x   /  AST  23  /  ALT  9<L>  /  AlkPhos  33<L>  08-28    imp: POD#1 s/p Jason procedure for perforated sigmoid diverituclitis  --awaiting bowel function.    --ET nurse consult  --ok for clears today  --trend WBC.  Continue cipro/flagyl for at least 72 hrs or when leukocytosis normalizes  --OOB, encourage IS

## 2020-08-28 NOTE — PROGRESS NOTE ADULT - ASSESSMENT
Pt is a 44 year old female with first episode of diverticulitis, s/p Hilario's procedure POD 1 .    Plan:    -Monitor vitals  -Diet: NPO  - Continue IVF  - D/C bhagat  -Pain control prn  -Anti emetic prn  -Serial abd exams  -Encourage oob/ambulation  -DVT ppx    Will discuss plan with

## 2020-08-28 NOTE — PROGRESS NOTE ADULT - SUBJECTIVE AND OBJECTIVE BOX
Pt was seen and examined at bedside this morning with colorectal surgery team. No acute overnight events reported by nursing staff. Pt states pain has improved since prior clinical exam. Pt offers no other complaints at this time. Denies fever/cp/sob/n/v/d/c. VSS.       ICU Vital Signs Last 24 Hrs  T(C): 36.4 (28 Aug 2020 07:47), Max: 37.6 (27 Aug 2020 08:19)  T(F): 97.5 (28 Aug 2020 07:47), Max: 99.7 (27 Aug 2020 08:19)  HR: 86 (28 Aug 2020 07:47) (74 - 107)  BP: 100/64 (28 Aug 2020 07:47) (91/61 - 116/73)  BP(mean): --  ABP: --  ABP(mean): --  RR: 18 (28 Aug 2020 07:47) (8 - 18)  SpO2: 92% (28 Aug 2020 07:47) (92% - 97%)    PHYSICAL EXAM:  Constitutional: NAD, GCS: 15/15  AOX3  Neck:  WNL, non tender  Respiratory: CTABL  Cardiovascular:  S1+S2+0  Gastrointestinal: Soft, mildly tender at the incision site. Dressing in place c/d/i .                                                 11.5   21.19 )-----------( 192      ( 28 Aug 2020 07:44 )             34.8   08-27    137  |  105  |  10  ----------------------------<  83  3.7   |  24  |  0.70    Ca    8.5      27 Aug 2020 16:53  Phos  1.7     08-27  Mg     1.7     08-27    TPro  5.6<L>  /  Alb  2.4<L>  /  TBili  0.5  /  DBili  x   /  AST  9<L>  /  ALT  7<L>  /  AlkPhos  33<L>  08-27

## 2020-08-29 LAB
ALBUMIN SERPL ELPH-MCNC: 1.9 G/DL — LOW (ref 3.3–5)
ALP SERPL-CCNC: 39 U/L — LOW (ref 40–120)
ALT FLD-CCNC: 14 U/L — SIGNIFICANT CHANGE UP (ref 12–78)
ANION GAP SERPL CALC-SCNC: 6 MMOL/L — SIGNIFICANT CHANGE UP (ref 5–17)
AST SERPL-CCNC: 36 U/L — SIGNIFICANT CHANGE UP (ref 15–37)
BILIRUB SERPL-MCNC: 0.3 MG/DL — SIGNIFICANT CHANGE UP (ref 0.2–1.2)
BUN SERPL-MCNC: 5 MG/DL — LOW (ref 7–23)
CALCIUM SERPL-MCNC: 8.1 MG/DL — LOW (ref 8.5–10.1)
CHLORIDE SERPL-SCNC: 105 MMOL/L — SIGNIFICANT CHANGE UP (ref 96–108)
CO2 SERPL-SCNC: 28 MMOL/L — SIGNIFICANT CHANGE UP (ref 22–31)
CREAT SERPL-MCNC: 0.79 MG/DL — SIGNIFICANT CHANGE UP (ref 0.5–1.3)
GLUCOSE SERPL-MCNC: 87 MG/DL — SIGNIFICANT CHANGE UP (ref 70–99)
HCT VFR BLD CALC: 35.4 % — SIGNIFICANT CHANGE UP (ref 34.5–45)
HGB BLD-MCNC: 11.4 G/DL — LOW (ref 11.5–15.5)
MAGNESIUM SERPL-MCNC: 1.8 MG/DL — SIGNIFICANT CHANGE UP (ref 1.6–2.6)
MCHC RBC-ENTMCNC: 29.8 PG — SIGNIFICANT CHANGE UP (ref 27–34)
MCHC RBC-ENTMCNC: 32.2 GM/DL — SIGNIFICANT CHANGE UP (ref 32–36)
MCV RBC AUTO: 92.4 FL — SIGNIFICANT CHANGE UP (ref 80–100)
PHOSPHATE SERPL-MCNC: 1.7 MG/DL — LOW (ref 2.5–4.5)
PLATELET # BLD AUTO: 250 K/UL — SIGNIFICANT CHANGE UP (ref 150–400)
POTASSIUM SERPL-MCNC: 3.1 MMOL/L — LOW (ref 3.5–5.3)
POTASSIUM SERPL-SCNC: 3.1 MMOL/L — LOW (ref 3.5–5.3)
PROT SERPL-MCNC: 5.8 GM/DL — LOW (ref 6–8.3)
RBC # BLD: 3.83 M/UL — SIGNIFICANT CHANGE UP (ref 3.8–5.2)
RBC # FLD: 12.7 % — SIGNIFICANT CHANGE UP (ref 10.3–14.5)
SODIUM SERPL-SCNC: 139 MMOL/L — SIGNIFICANT CHANGE UP (ref 135–145)
WBC # BLD: 17.44 K/UL — HIGH (ref 3.8–10.5)
WBC # FLD AUTO: 17.44 K/UL — HIGH (ref 3.8–10.5)

## 2020-08-29 PROCEDURE — 71045 X-RAY EXAM CHEST 1 VIEW: CPT | Mod: 26

## 2020-08-29 RX ORDER — POLYETHYLENE GLYCOL 3350 17 G/17G
17 POWDER, FOR SOLUTION ORAL
Refills: 0 | Status: DISCONTINUED | OUTPATIENT
Start: 2020-08-29 | End: 2020-09-01

## 2020-08-29 RX ORDER — NICOTINE POLACRILEX 2 MG
1 GUM BUCCAL DAILY
Refills: 0 | Status: DISCONTINUED | OUTPATIENT
Start: 2020-08-29 | End: 2020-09-01

## 2020-08-29 RX ORDER — FUROSEMIDE 40 MG
20 TABLET ORAL ONCE
Refills: 0 | Status: COMPLETED | OUTPATIENT
Start: 2020-08-29 | End: 2020-08-29

## 2020-08-29 RX ORDER — POTASSIUM CHLORIDE 20 MEQ
10 PACKET (EA) ORAL
Refills: 0 | Status: COMPLETED | OUTPATIENT
Start: 2020-08-29 | End: 2020-08-29

## 2020-08-29 RX ADMIN — Medication 100 MILLIGRAM(S): at 14:00

## 2020-08-29 RX ADMIN — HEPARIN SODIUM 5000 UNIT(S): 5000 INJECTION INTRAVENOUS; SUBCUTANEOUS at 20:47

## 2020-08-29 RX ADMIN — HEPARIN SODIUM 5000 UNIT(S): 5000 INJECTION INTRAVENOUS; SUBCUTANEOUS at 06:22

## 2020-08-29 RX ADMIN — Medication 100 MILLIGRAM(S): at 20:47

## 2020-08-29 RX ADMIN — Medication 200 MILLIGRAM(S): at 17:53

## 2020-08-29 RX ADMIN — FAMOTIDINE 20 MILLIGRAM(S): 10 INJECTION INTRAVENOUS at 10:46

## 2020-08-29 RX ADMIN — ONDANSETRON 4 MILLIGRAM(S): 8 TABLET, FILM COATED ORAL at 20:46

## 2020-08-29 RX ADMIN — Medication 100 MILLIGRAM(S): at 06:22

## 2020-08-29 RX ADMIN — SODIUM CHLORIDE 100 MILLILITER(S): 9 INJECTION, SOLUTION INTRAVENOUS at 03:48

## 2020-08-29 RX ADMIN — Medication 200 MILLIGRAM(S): at 07:47

## 2020-08-29 RX ADMIN — HEPARIN SODIUM 5000 UNIT(S): 5000 INJECTION INTRAVENOUS; SUBCUTANEOUS at 14:01

## 2020-08-29 RX ADMIN — Medication 100 MILLIEQUIVALENT(S): at 15:55

## 2020-08-29 RX ADMIN — Medication 20 MILLIGRAM(S): at 15:37

## 2020-08-29 RX ADMIN — Medication 1 PATCH: at 13:59

## 2020-08-29 NOTE — PROGRESS NOTE ADULT - ASSESSMENT
POD 2 s/p Hilario's for perforated diverticulitis. Await bowel function. Cxr with bilateral infiltrates representing either multifocal pneumonia of pulmonary congestion, favor the later.     Full liquids  Will stop IVFs  Ambulate and IS  VTE prophylaxis  Continue IV antibiotics, WBC slowing coming down, 17 today  Prn pain control  Bowel regimen.

## 2020-08-29 NOTE — PROGRESS NOTE ADULT - SUBJECTIVE AND OBJECTIVE BOX
Complains of fatigue and lethargy. Needing supplemental oxygen and sats reportedly dropping to 80s when oxygen removed. Tolerating liquids. No colostomy function    Exam:  Vital Signs Last 24 Hrs  T(C): 37.1 (29 Aug 2020 08:30), Max: 37.1 (29 Aug 2020 08:30)  T(F): 98.8 (29 Aug 2020 08:30), Max: 98.8 (29 Aug 2020 08:30)  HR: 100 (29 Aug 2020 08:30) (91 - 103)  BP: 102/78 (29 Aug 2020 08:30) (97/64 - 102/78)  RR: 16 (29 Aug 2020 08:30) (14 - 18)  SpO2: 95% (29 Aug 2020 08:30) (92% - 96%)    In no distress  Non labored breathing on 3L NC  Abdomen soft, distended, non tender, incision clean and intact with staples, colostomy healthy without output  NII drain with bloody output  Alert and oriented x 3                          11.4   17.44 )-----------( 250      ( 29 Aug 2020 10:27 )             35.4   08-28    136  |  106  |  8   ----------------------------<  139<H>  3.8   |  25  |  0.70    Ca    7.8<L>      28 Aug 2020 07:44  Phos  2.2     08-28  Mg     1.7     08-28    TPro  5.2<L>  /  Alb  1.9<L>  /  TBili  0.3  /  DBili  x   /  AST  23  /  ALT  9<L>  /  AlkPhos  33<L>  08-28    < from: Xray Chest 1 View-PORTABLE IMMEDIATE (08.29.20 @ 09:53) >  IMPRESSION:  Mild to moderate pulmonary vascular congestion and patchy airspace consolidationsrepresenting pulmonary edema versus multifocal pneumonia.    MEDICATIONS  (STANDING):  ciprofloxacin   IVPB 400 milliGRAM(s) IV Intermittent every 12 hours  famotidine Injectable 20 milliGRAM(s) IV Push daily  heparin   Injectable 5000 Unit(s) SubCutaneous every 8 hours  HYDROmorphone PCA (1 mG/mL) 30 milliLiter(s) PCA Continuous PCA Continuous  lactated ringers. 1000 milliLiter(s) (50 mL/Hr) IV Continuous <Continuous>  metroNIDAZOLE  IVPB 500 milliGRAM(s) IV Intermittent every 8 hours  polyethylene glycol 3350 17 Gram(s) Oral two times a day

## 2020-08-30 LAB
ALBUMIN SERPL ELPH-MCNC: 1.9 G/DL — LOW (ref 3.3–5)
ALP SERPL-CCNC: 35 U/L — LOW (ref 40–120)
ALT FLD-CCNC: 12 U/L — SIGNIFICANT CHANGE UP (ref 12–78)
ANION GAP SERPL CALC-SCNC: 8 MMOL/L — SIGNIFICANT CHANGE UP (ref 5–17)
AST SERPL-CCNC: 19 U/L — SIGNIFICANT CHANGE UP (ref 15–37)
BILIRUB SERPL-MCNC: 0.3 MG/DL — SIGNIFICANT CHANGE UP (ref 0.2–1.2)
BUN SERPL-MCNC: 4 MG/DL — LOW (ref 7–23)
CALCIUM SERPL-MCNC: 7.8 MG/DL — LOW (ref 8.5–10.1)
CHLORIDE SERPL-SCNC: 99 MMOL/L — SIGNIFICANT CHANGE UP (ref 96–108)
CO2 SERPL-SCNC: 30 MMOL/L — SIGNIFICANT CHANGE UP (ref 22–31)
CREAT SERPL-MCNC: 0.58 MG/DL — SIGNIFICANT CHANGE UP (ref 0.5–1.3)
CULTURE RESULTS: SIGNIFICANT CHANGE UP
CULTURE RESULTS: SIGNIFICANT CHANGE UP
GLUCOSE SERPL-MCNC: 98 MG/DL — SIGNIFICANT CHANGE UP (ref 70–99)
HCT VFR BLD CALC: 32.8 % — LOW (ref 34.5–45)
HGB BLD-MCNC: 10.6 G/DL — LOW (ref 11.5–15.5)
MCHC RBC-ENTMCNC: 29.5 PG — SIGNIFICANT CHANGE UP (ref 27–34)
MCHC RBC-ENTMCNC: 32.3 GM/DL — SIGNIFICANT CHANGE UP (ref 32–36)
MCV RBC AUTO: 91.4 FL — SIGNIFICANT CHANGE UP (ref 80–100)
PLATELET # BLD AUTO: 272 K/UL — SIGNIFICANT CHANGE UP (ref 150–400)
POTASSIUM SERPL-MCNC: 2.8 MMOL/L — CRITICAL LOW (ref 3.5–5.3)
POTASSIUM SERPL-SCNC: 2.8 MMOL/L — CRITICAL LOW (ref 3.5–5.3)
PROT SERPL-MCNC: 5.7 GM/DL — LOW (ref 6–8.3)
RBC # BLD: 3.59 M/UL — LOW (ref 3.8–5.2)
RBC # FLD: 12.6 % — SIGNIFICANT CHANGE UP (ref 10.3–14.5)
SODIUM SERPL-SCNC: 137 MMOL/L — SIGNIFICANT CHANGE UP (ref 135–145)
SPECIMEN SOURCE: SIGNIFICANT CHANGE UP
SPECIMEN SOURCE: SIGNIFICANT CHANGE UP
WBC # BLD: 10.72 K/UL — HIGH (ref 3.8–10.5)
WBC # FLD AUTO: 10.72 K/UL — HIGH (ref 3.8–10.5)

## 2020-08-30 PROCEDURE — 71045 X-RAY EXAM CHEST 1 VIEW: CPT | Mod: 26

## 2020-08-30 RX ORDER — OXYCODONE AND ACETAMINOPHEN 5; 325 MG/1; MG/1
2 TABLET ORAL EVERY 6 HOURS
Refills: 0 | Status: DISCONTINUED | OUTPATIENT
Start: 2020-08-30 | End: 2020-08-31

## 2020-08-30 RX ORDER — OXYCODONE AND ACETAMINOPHEN 5; 325 MG/1; MG/1
1 TABLET ORAL EVERY 6 HOURS
Refills: 0 | Status: DISCONTINUED | OUTPATIENT
Start: 2020-08-30 | End: 2020-08-31

## 2020-08-30 RX ORDER — POTASSIUM CHLORIDE 20 MEQ
40 PACKET (EA) ORAL EVERY 4 HOURS
Refills: 0 | Status: COMPLETED | OUTPATIENT
Start: 2020-08-30 | End: 2020-08-30

## 2020-08-30 RX ADMIN — Medication 1 PATCH: at 09:53

## 2020-08-30 RX ADMIN — HEPARIN SODIUM 5000 UNIT(S): 5000 INJECTION INTRAVENOUS; SUBCUTANEOUS at 14:37

## 2020-08-30 RX ADMIN — HEPARIN SODIUM 5000 UNIT(S): 5000 INJECTION INTRAVENOUS; SUBCUTANEOUS at 20:55

## 2020-08-30 RX ADMIN — Medication 100 MILLIGRAM(S): at 05:45

## 2020-08-30 RX ADMIN — FAMOTIDINE 20 MILLIGRAM(S): 10 INJECTION INTRAVENOUS at 09:39

## 2020-08-30 RX ADMIN — Medication 100 MILLIGRAM(S): at 14:37

## 2020-08-30 RX ADMIN — ONDANSETRON 4 MILLIGRAM(S): 8 TABLET, FILM COATED ORAL at 22:17

## 2020-08-30 RX ADMIN — Medication 1 PATCH: at 13:00

## 2020-08-30 RX ADMIN — Medication 100 MILLIGRAM(S): at 20:56

## 2020-08-30 RX ADMIN — Medication 40 MILLIEQUIVALENT(S): at 11:46

## 2020-08-30 RX ADMIN — OXYCODONE AND ACETAMINOPHEN 1 TABLET(S): 5; 325 TABLET ORAL at 01:30

## 2020-08-30 RX ADMIN — Medication 1 PATCH: at 18:29

## 2020-08-30 RX ADMIN — Medication 200 MILLIGRAM(S): at 06:54

## 2020-08-30 RX ADMIN — POLYETHYLENE GLYCOL 3350 17 GRAM(S): 17 POWDER, FOR SOLUTION ORAL at 09:53

## 2020-08-30 RX ADMIN — HEPARIN SODIUM 5000 UNIT(S): 5000 INJECTION INTRAVENOUS; SUBCUTANEOUS at 05:45

## 2020-08-30 RX ADMIN — OXYCODONE AND ACETAMINOPHEN 1 TABLET(S): 5; 325 TABLET ORAL at 12:29

## 2020-08-30 RX ADMIN — Medication 40 MILLIEQUIVALENT(S): at 20:56

## 2020-08-30 RX ADMIN — OXYCODONE AND ACETAMINOPHEN 1 TABLET(S): 5; 325 TABLET ORAL at 17:47

## 2020-08-30 RX ADMIN — ONDANSETRON 4 MILLIGRAM(S): 8 TABLET, FILM COATED ORAL at 05:57

## 2020-08-30 RX ADMIN — Medication 1 PATCH: at 19:00

## 2020-08-30 RX ADMIN — Medication 200 MILLIGRAM(S): at 17:31

## 2020-08-30 RX ADMIN — Medication 40 MILLIEQUIVALENT(S): at 16:05

## 2020-08-30 NOTE — PROGRESS NOTE ADULT - ASSESSMENT
POD 3 s/p Hilario's for perforated diverticulitis.     Advance to solid diet  Bowel regimen  Leukocytosis resolving, continue cipro/flagyl  Stop PCA, oral pain meds prn  Ambulate and IS  VTE prophylaxis  Replace potassium

## 2020-08-30 NOTE — PROGRESS NOTE ADULT - SUBJECTIVE AND OBJECTIVE BOX
Feels much better this morning in terms of strength and shortness of breath. Received 20 mg of lasix yesterday and voiding a lot although I/O not documented. Tolerating diet. Some flatus from colostomy but no stool     Exam:  Vital Signs Last 24 Hrs  T(C): 36.8 (30 Aug 2020 09:39), Max: 37.2 (29 Aug 2020 20:43)  T(F): 98.2 (30 Aug 2020 09:39), Max: 98.9 (29 Aug 2020 20:43)  HR: 101 (30 Aug 2020 09:39) (97 - 104)  BP: 94/65 (30 Aug 2020 09:39) (94/65 - 115/81)  RR: 16 (30 Aug 2020 09:39) (16 - 18)  SpO2: 91% (30 Aug 2020 09:39) (91% - 100%)    In no distress  Non labored breathing   Abdomen soft, distended, non tender, incision clean and intact with staples, colostomy healthy without stool output but some flatus  NII drain with SS output  Alert and oriented x 3                          10.6   10.72 )-----------( 272      ( 30 Aug 2020 08:55 )             32.8   08-30    137  |  99  |  4<L>  ----------------------------<  98  2.8<LL>   |  30  |  0.58    Ca    7.8<L>      30 Aug 2020 08:55  Phos  1.7     08-29  Mg     1.8     08-29    TPro  5.7<L>  /  Alb  1.9<L>  /  TBili  0.3  /  DBili  x   /  AST  19  /  ALT  12  /  AlkPhos  35<L>  08-30

## 2020-08-31 ENCOUNTER — TRANSCRIPTION ENCOUNTER (OUTPATIENT)
Age: 45
End: 2020-08-31

## 2020-08-31 LAB
ALBUMIN SERPL ELPH-MCNC: 1.7 G/DL — LOW (ref 3.3–5)
ALP SERPL-CCNC: 30 U/L — LOW (ref 40–120)
ALT FLD-CCNC: 10 U/L — LOW (ref 12–78)
ANION GAP SERPL CALC-SCNC: 4 MMOL/L — LOW (ref 5–17)
AST SERPL-CCNC: 14 U/L — LOW (ref 15–37)
BILIRUB SERPL-MCNC: 0.2 MG/DL — SIGNIFICANT CHANGE UP (ref 0.2–1.2)
BUN SERPL-MCNC: 4 MG/DL — LOW (ref 7–23)
CALCIUM SERPL-MCNC: 8.1 MG/DL — LOW (ref 8.5–10.1)
CHLORIDE SERPL-SCNC: 105 MMOL/L — SIGNIFICANT CHANGE UP (ref 96–108)
CO2 SERPL-SCNC: 31 MMOL/L — SIGNIFICANT CHANGE UP (ref 22–31)
CREAT SERPL-MCNC: 0.61 MG/DL — SIGNIFICANT CHANGE UP (ref 0.5–1.3)
GLUCOSE SERPL-MCNC: 117 MG/DL — HIGH (ref 70–99)
HCT VFR BLD CALC: 32.1 % — LOW (ref 34.5–45)
HGB BLD-MCNC: 10.3 G/DL — LOW (ref 11.5–15.5)
MAGNESIUM SERPL-MCNC: 1.9 MG/DL — SIGNIFICANT CHANGE UP (ref 1.6–2.6)
MCHC RBC-ENTMCNC: 29.5 PG — SIGNIFICANT CHANGE UP (ref 27–34)
MCHC RBC-ENTMCNC: 32.1 GM/DL — SIGNIFICANT CHANGE UP (ref 32–36)
MCV RBC AUTO: 92 FL — SIGNIFICANT CHANGE UP (ref 80–100)
PHOSPHATE SERPL-MCNC: 2.3 MG/DL — LOW (ref 2.5–4.5)
PLATELET # BLD AUTO: 306 K/UL — SIGNIFICANT CHANGE UP (ref 150–400)
POTASSIUM SERPL-MCNC: 3.6 MMOL/L — SIGNIFICANT CHANGE UP (ref 3.5–5.3)
POTASSIUM SERPL-SCNC: 3.6 MMOL/L — SIGNIFICANT CHANGE UP (ref 3.5–5.3)
PROT SERPL-MCNC: 5.3 GM/DL — LOW (ref 6–8.3)
RBC # BLD: 3.49 M/UL — LOW (ref 3.8–5.2)
RBC # FLD: 12.8 % — SIGNIFICANT CHANGE UP (ref 10.3–14.5)
SODIUM SERPL-SCNC: 140 MMOL/L — SIGNIFICANT CHANGE UP (ref 135–145)
WBC # BLD: 8.83 K/UL — SIGNIFICANT CHANGE UP (ref 3.8–10.5)
WBC # FLD AUTO: 8.83 K/UL — SIGNIFICANT CHANGE UP (ref 3.8–10.5)

## 2020-08-31 PROCEDURE — 99221 1ST HOSP IP/OBS SF/LOW 40: CPT

## 2020-08-31 RX ORDER — IBUPROFEN 200 MG
400 TABLET ORAL EVERY 6 HOURS
Refills: 0 | Status: DISCONTINUED | OUTPATIENT
Start: 2020-08-31 | End: 2020-09-01

## 2020-08-31 RX ORDER — ACETAMINOPHEN 500 MG
1000 TABLET ORAL ONCE
Refills: 0 | Status: DISCONTINUED | OUTPATIENT
Start: 2020-08-31 | End: 2020-09-01

## 2020-08-31 RX ORDER — KETOROLAC TROMETHAMINE 30 MG/ML
15 SYRINGE (ML) INJECTION EVERY 8 HOURS
Refills: 0 | Status: DISCONTINUED | OUTPATIENT
Start: 2020-08-31 | End: 2020-08-31

## 2020-08-31 RX ORDER — CIPROFLOXACIN LACTATE 400MG/40ML
500 VIAL (ML) INTRAVENOUS EVERY 12 HOURS
Refills: 0 | Status: DISCONTINUED | OUTPATIENT
Start: 2020-08-31 | End: 2020-09-01

## 2020-08-31 RX ORDER — TRAMADOL HYDROCHLORIDE 50 MG/1
50 TABLET ORAL EVERY 6 HOURS
Refills: 0 | Status: DISCONTINUED | OUTPATIENT
Start: 2020-08-31 | End: 2020-09-01

## 2020-08-31 RX ORDER — METRONIDAZOLE 500 MG
500 TABLET ORAL EVERY 8 HOURS
Refills: 0 | Status: DISCONTINUED | OUTPATIENT
Start: 2020-08-31 | End: 2020-09-01

## 2020-08-31 RX ADMIN — POLYETHYLENE GLYCOL 3350 17 GRAM(S): 17 POWDER, FOR SOLUTION ORAL at 22:30

## 2020-08-31 RX ADMIN — Medication 500 MILLIGRAM(S): at 22:29

## 2020-08-31 RX ADMIN — OXYCODONE AND ACETAMINOPHEN 2 TABLET(S): 5; 325 TABLET ORAL at 01:28

## 2020-08-31 RX ADMIN — HEPARIN SODIUM 5000 UNIT(S): 5000 INJECTION INTRAVENOUS; SUBCUTANEOUS at 22:30

## 2020-08-31 RX ADMIN — Medication 1 PATCH: at 11:17

## 2020-08-31 RX ADMIN — Medication 100 MILLIGRAM(S): at 13:20

## 2020-08-31 RX ADMIN — HEPARIN SODIUM 5000 UNIT(S): 5000 INJECTION INTRAVENOUS; SUBCUTANEOUS at 06:51

## 2020-08-31 RX ADMIN — Medication 400 MILLIGRAM(S): at 17:28

## 2020-08-31 RX ADMIN — Medication 1 PATCH: at 19:00

## 2020-08-31 RX ADMIN — Medication 15 MILLIGRAM(S): at 15:38

## 2020-08-31 RX ADMIN — ONDANSETRON 4 MILLIGRAM(S): 8 TABLET, FILM COATED ORAL at 07:44

## 2020-08-31 RX ADMIN — Medication 200 MILLIGRAM(S): at 06:53

## 2020-08-31 RX ADMIN — FAMOTIDINE 20 MILLIGRAM(S): 10 INJECTION INTRAVENOUS at 11:22

## 2020-08-31 RX ADMIN — HEPARIN SODIUM 5000 UNIT(S): 5000 INJECTION INTRAVENOUS; SUBCUTANEOUS at 13:20

## 2020-08-31 RX ADMIN — Medication 1 PATCH: at 11:23

## 2020-08-31 RX ADMIN — OXYCODONE AND ACETAMINOPHEN 2 TABLET(S): 5; 325 TABLET ORAL at 00:28

## 2020-08-31 RX ADMIN — Medication 1 PATCH: at 06:56

## 2020-08-31 RX ADMIN — Medication 100 MILLIGRAM(S): at 05:27

## 2020-08-31 NOTE — ADVANCED PRACTICE NURSE CONSULT - ASSESSMENT
HPI:This is a 44 year old healthy female who presents with a couple of day history of abdominal pain. Patient had surgery on 8/27/2020 for perforated diverticulitis and Jason’s pouch.   In to initiate ostomy education. Patient presents sitting in chair. Reviewed creation and function of stoma. Reviewed diet, bathing and supplies. Stoma located on the LLQ and is pink, moist, viable and nicely protruded. New appliance placed. Stoma measures 1 ¼". Peristomal skin intact. 1 ¾" wafer cut to fit stoma and placed. Pouch snapped into place. Demonstrated how to empty pouch and how to change pouch. Patient receptive. Ostomy education folder reviewed. Patient enrolled into [employer] program.   PAST MEDICAL & SURGICAL HISTORY:  H/O breast augmentation  H/O tubal ligation  REVIEW OF SYSTEMS  General: Reports nausea  Skin/Breast:  Ophthalmologic:  ENMT:	  Respiratory and Thorax: Denies shortness of breath  Cardiovascular:	Denies chest pain  Gastrointestinal:	  Genitourinary:	  Musculoskeletal:	  Neurological:	  Psychiatric:	  Hematology/Lymphatics:	  Endocrine:	  Allergic/Immunologic:	  MEDICATIONS  (STANDING):  ciprofloxacin   IVPB 400 milliGRAM(s) IV Intermittent every 12 hours  famotidine Injectable 20 milliGRAM(s) IV Push daily  heparin   Injectable 5000 Unit(s) SubCutaneous every 8 hours  metroNIDAZOLE  IVPB 500 milliGRAM(s) IV Intermittent every 8 hours  nicotine -  14 mG/24Hr(s) Patch 1 patch Transdermal daily  polyethylene glycol 3350 17 Gram(s) Oral two times a day  MEDICATIONS  (PRN):  naloxone Injectable 0.1 milliGRAM(s) IV Push every 3 minutes PRN For ANY of the following changes in patient status:  A. RR LESS THAN 10 breaths per minute, B. Oxygen saturation LESS THAN 90%, C. Sedation score of 6  ondansetron Injectable 4 milliGRAM(s) IV Push every 6 hours PRN Nausea  oxycodone    5 mG/acetaminophen 325 mG 1 Tablet(s) Oral every 6 hours PRN Mild Pain (1 - 3)  oxycodone    5 mG/acetaminophen 325 mG 2 Tablet(s) Oral every 6 hours PRN Moderate Pain (4 - 6)  Allergies  No Known Allergies  Intolerances  SOCIAL HISTORY:  FAMILY HISTORY:  FH: breast cancer: Mother  FH: bladder cancer: Father      Vital Signs Last 24 Hrs  T(C): 36.9 (30 Aug 2020 22:13), Max: 37.1 (30 Aug 2020 16:44)  T(F): 98.5 (30 Aug 2020 22:13), Max: 98.7 (30 Aug 2020 16:44)  HR: 92 (30 Aug 2020 22:13) (91 - 92)  BP: 105/67 (30 Aug 2020 22:13) (104/66 - 105/67)  BP(mean): --  RR: 18 (30 Aug 2020 22:13) (15 - 18)  SpO2: 99% (30 Aug 2020 22:13) (95% - 99%)  PHYSICAL EXAM:  Constitutional:  Eyes: conjunctiva and sclera clear  ENMT:  Neck:  Breasts:  Back:  Respiratory: Respirations unlabored, no distress  Cardiovascular:  Gastrointestinal: Abdomen softly distended, stoma to LLQ, some flatus, no stool  Genitourinary:  Rectal:  Extremities:  Vascular:  Neurological:  Skin: Intact  Lymph Nodes:  Musculoskeletal:  Psychiatric: Alert and oriented x 4  LABS:                      10.3   8.83  )-----------( 306      ( 31 Aug 2020 07:57 )             32.1   08-31    140  |  105  |  4<L>  ----------------------------<  117<H>  3.6   |  31  |  0.61    Ca    8.1<L>      31 Aug 2020 07:57  Phos  2.3     08-31  Mg     1.9     08-31

## 2020-08-31 NOTE — PROGRESS NOTE ADULT - SUBJECTIVE AND OBJECTIVE BOX
No c/o. Nelli small amounts of low fiber diet. Colostomy with air though minimal stool.  Eager to be discharged.    MEDICATIONS  (STANDING):  ciprofloxacin   IVPB 400 milliGRAM(s) IV Intermittent every 12 hours  famotidine Injectable 20 milliGRAM(s) IV Push daily  heparin   Injectable 5000 Unit(s) SubCutaneous every 8 hours  metroNIDAZOLE  IVPB 500 milliGRAM(s) IV Intermittent every 8 hours  nicotine -  14 mG/24Hr(s) Patch 1 patch Transdermal daily  polyethylene glycol 3350 17 Gram(s) Oral two times a day    MEDICATIONS  (PRN):  naloxone Injectable 0.1 milliGRAM(s) IV Push every 3 minutes PRN For ANY of the following changes in patient status:  A. RR LESS THAN 10 breaths per minute, B. Oxygen saturation LESS THAN 90%, C. Sedation score of 6  ondansetron Injectable 4 milliGRAM(s) IV Push every 6 hours PRN Nausea  oxycodone    5 mG/acetaminophen 325 mG 1 Tablet(s) Oral every 6 hours PRN Mild Pain (1 - 3)  oxycodone    5 mG/acetaminophen 325 mG 2 Tablet(s) Oral every 6 hours PRN Moderate Pain (4 - 6)    Vital Signs Last 24 Hrs  T(C): 36.9 (30 Aug 2020 22:13), Max: 37.1 (30 Aug 2020 16:44)  T(F): 98.5 (30 Aug 2020 22:13), Max: 98.7 (30 Aug 2020 16:44)  HR: 92 (30 Aug 2020 22:13) (91 - 101)  BP: 105/67 (30 Aug 2020 22:13) (94/65 - 105/67)  BP(mean): --  RR: 18 (30 Aug 2020 22:13) (15 - 18)  SpO2: 99% (30 Aug 2020 22:13) (91% - 99%)  I&O's Detail    30 Aug 2020 07:01  -  31 Aug 2020 07:00  --------------------------------------------------------  IN:    IV PiggyBack: 300 mL    Oral Fluid: 960 mL  Total IN: 1260 mL    OUT:    Bulb: 75 mL    Voided: 1375 mL  Total OUT: 1450 mL    Total NET: -190 mL    NAD  ABD exam : incision CDI, colostomy healthy, soft, approp tender, mild distention                        10.3   8.83  )-----------( 306      ( 31 Aug 2020 07:57 )             32.1     08-30    137  |  99  |  4<L>  ----------------------------<  98  2.8<LL>   |  30  |  0.58    Ca    7.8<L>      30 Aug 2020 08:55  Phos  1.7     08-29  Mg     1.8     08-29    TPro  5.7<L>  /  Alb  1.9<L>  /  TBili  0.3  /  DBili  x   /  AST  19  /  ALT  12  /  AlkPhos  35<L>  08-30

## 2020-08-31 NOTE — DISCHARGE NOTE PROVIDER - NSDCMRMEDTOKEN_GEN_ALL_CORE_FT
oxycodone-acetaminophen 5 mg-325 mg oral tablet: 1 tab(s) orally every 6 hours, As Needed -for moderate pain [id number]

## 2020-08-31 NOTE — DISCHARGE NOTE NURSING/CASE MANAGEMENT/SOCIAL WORK - PATIENT PORTAL LINK FT
You can access the FollowMyHealth Patient Portal offered by [facility] by registering at the following website: [email or web address]. By joining [facility]’s FollowMyHealth portal, you will also be able to view your health information using other applications (apps) compatible with our system.

## 2020-08-31 NOTE — PROGRESS NOTE ADULT - ASSESSMENT
imp: POD# 4 s/p Jason procedure  --will hold off discharge for now.  Examination suspicious for impending ileus.  --will check labs tomorrow, if not resolved, may consider KUB  --may need additional diuresis.  --d/c percocet.  will try scheduled ibuprofen with PRN tramadol.

## 2020-08-31 NOTE — PROGRESS NOTE ADULT - ASSESSMENT
POD 4 Hartmanns procedure    Colostomy teaching today.  Home care setup for colostomy and drain.  Cont low fiber diet.  Miralax BID.  Followup labs.  Possible d/c today.

## 2020-08-31 NOTE — DISCHARGE NOTE PROVIDER - NSDCCPCAREPLAN_GEN_ALL_CORE_FT
PRINCIPAL DISCHARGE DIAGNOSIS  Diagnosis: Sigmoid diverticulitis  Assessment and Plan of Treatment:       SECONDARY DISCHARGE DIAGNOSES  Diagnosis: Perforation of colon  Assessment and Plan of Treatment:

## 2020-08-31 NOTE — DISCHARGE NOTE PROVIDER - CARE PROVIDER_API CALL
Santiago Zavala)  ColonRectal Surgery; Surgery  [address] [place] [address]  Phone: [phone]  Fax: [phone]  Follow Up Time:

## 2020-08-31 NOTE — PROGRESS NOTE ADULT - SUBJECTIVE AND OBJECTIVE BOX
Patient c/o severe pain and nausea today along with no stoma output.  Pt reports no gas per stoma (while yesterday it was full of air).  She feels that percocet just made her even more nauseous.  Just received some toradol with improvement.    Vital Signs Last 24 Hrs  T(C): 36.8 (31 Aug 2020 15:25), Max: 37 (31 Aug 2020 11:12)  T(F): 98.3 (31 Aug 2020 15:25), Max: 98.6 (31 Aug 2020 11:12)  HR: 87 (31 Aug 2020 15:25) (87 - 92)  BP: 109/77 (31 Aug 2020 15:25) (105/67 - 119/82)  BP(mean): --  RR: 16 (31 Aug 2020 15:25) (16 - 18)  SpO2: 96% (31 Aug 2020 15:25) (95% - 99%)    AVSS  Abd mild distension/soft/moderate incisional tenderness upper abdomen, no rebound or guarding  stoma edematous, pink with no gas or stool.

## 2020-08-31 NOTE — DISCHARGE NOTE PROVIDER - HOSPITAL COURSE
Pt underwent a Hilario procedure for diverticulitis, postop course she received stoma education, tolerated diet, pain controlled, ambualting

## 2020-08-31 NOTE — DISCHARGE NOTE PROVIDER - NSDCACTIVITY_GEN_ALL_CORE
Stairs allowed/Driving allowed/Walking - Indoors allowed/Showering allowed/No heavy lifting/straining/Walking - Outdoors allowed

## 2020-09-01 VITALS
TEMPERATURE: 100 F | SYSTOLIC BLOOD PRESSURE: 112 MMHG | OXYGEN SATURATION: 94 % | DIASTOLIC BLOOD PRESSURE: 72 MMHG | RESPIRATION RATE: 18 BRPM | HEART RATE: 85 BPM

## 2020-09-01 LAB
ANION GAP SERPL CALC-SCNC: 7 MMOL/L — SIGNIFICANT CHANGE UP (ref 5–17)
BUN SERPL-MCNC: 7 MG/DL — SIGNIFICANT CHANGE UP (ref 7–23)
CALCIUM SERPL-MCNC: 8.3 MG/DL — LOW (ref 8.5–10.1)
CHLORIDE SERPL-SCNC: 104 MMOL/L — SIGNIFICANT CHANGE UP (ref 96–108)
CO2 SERPL-SCNC: 28 MMOL/L — SIGNIFICANT CHANGE UP (ref 22–31)
CREAT SERPL-MCNC: 0.53 MG/DL — SIGNIFICANT CHANGE UP (ref 0.5–1.3)
GLUCOSE SERPL-MCNC: 86 MG/DL — SIGNIFICANT CHANGE UP (ref 70–99)
HCT VFR BLD CALC: 30.6 % — LOW (ref 34.5–45)
HGB BLD-MCNC: 10 G/DL — LOW (ref 11.5–15.5)
MAGNESIUM SERPL-MCNC: 1.9 MG/DL — SIGNIFICANT CHANGE UP (ref 1.6–2.6)
MCHC RBC-ENTMCNC: 29.3 PG — SIGNIFICANT CHANGE UP (ref 27–34)
MCHC RBC-ENTMCNC: 32.7 GM/DL — SIGNIFICANT CHANGE UP (ref 32–36)
MCV RBC AUTO: 89.7 FL — SIGNIFICANT CHANGE UP (ref 80–100)
PHOSPHATE SERPL-MCNC: 3.2 MG/DL — SIGNIFICANT CHANGE UP (ref 2.5–4.5)
PLATELET # BLD AUTO: 321 K/UL — SIGNIFICANT CHANGE UP (ref 150–400)
POTASSIUM SERPL-MCNC: 4 MMOL/L — SIGNIFICANT CHANGE UP (ref 3.5–5.3)
POTASSIUM SERPL-SCNC: 4 MMOL/L — SIGNIFICANT CHANGE UP (ref 3.5–5.3)
RBC # BLD: 3.41 M/UL — LOW (ref 3.8–5.2)
RBC # FLD: 12.9 % — SIGNIFICANT CHANGE UP (ref 10.3–14.5)
SODIUM SERPL-SCNC: 139 MMOL/L — SIGNIFICANT CHANGE UP (ref 135–145)
WBC # BLD: 9.97 K/UL — SIGNIFICANT CHANGE UP (ref 3.8–10.5)
WBC # FLD AUTO: 9.97 K/UL — SIGNIFICANT CHANGE UP (ref 3.8–10.5)

## 2020-09-01 PROCEDURE — 99238 HOSP IP/OBS DSCHRG MGMT 30/<: CPT

## 2020-09-01 RX ORDER — METRONIDAZOLE 500 MG
500 TABLET ORAL EVERY 8 HOURS
Refills: 0 | Status: DISCONTINUED | OUTPATIENT
Start: 2020-09-01 | End: 2020-09-01

## 2020-09-01 RX ORDER — CIPROFLOXACIN LACTATE 400MG/40ML
400 VIAL (ML) INTRAVENOUS EVERY 12 HOURS
Refills: 0 | Status: DISCONTINUED | OUTPATIENT
Start: 2020-09-01 | End: 2020-09-01

## 2020-09-01 RX ADMIN — Medication 400 MILLIGRAM(S): at 06:03

## 2020-09-01 RX ADMIN — Medication 500 MILLIGRAM(S): at 11:00

## 2020-09-01 RX ADMIN — Medication 100 MILLIGRAM(S): at 14:51

## 2020-09-01 RX ADMIN — Medication 400 MILLIGRAM(S): at 01:05

## 2020-09-01 RX ADMIN — Medication 1 PATCH: at 11:23

## 2020-09-01 RX ADMIN — Medication 1 PATCH: at 10:47

## 2020-09-01 RX ADMIN — Medication 400 MILLIGRAM(S): at 11:38

## 2020-09-01 RX ADMIN — Medication 1 PATCH: at 06:03

## 2020-09-01 RX ADMIN — Medication 400 MILLIGRAM(S): at 01:06

## 2020-09-01 RX ADMIN — HEPARIN SODIUM 5000 UNIT(S): 5000 INJECTION INTRAVENOUS; SUBCUTANEOUS at 06:03

## 2020-09-01 RX ADMIN — FAMOTIDINE 20 MILLIGRAM(S): 10 INJECTION INTRAVENOUS at 10:45

## 2020-09-01 NOTE — CHART NOTE - NSCHARTNOTEFT_GEN_A_CORE
Sepsis present on admission  Respiratory issues postop most likely related to atelectasis  postoperative ileus

## 2020-09-01 NOTE — CDI QUERY NOTE - NSCDIOTHERTXTBX_GEN_ALL_CORE_HH
Documentation on chart:    Sigmoid diverticulitis with perforation and abscess      On admission: 4 Liter NS - WBC 14  - 101.8F -  - Received - Zoysn - Cipro - Flagyl IV      8/26/2020:  43 y/o female no pmhx presented to ED w/ abdominal pain. Admitted w/ sigmoid diverticulitis w/ a localized perforation. Admitted to med/surg, treated w/ IVF and started on cipro and flagyl. This am noted to be tachycardic, febrile w/ abdominal and chest pain.     Vital signs  BP :   106/65  HR: 113  O2 sat 96% on room air  Temp 101.8    Recommend:  - Sepsis fluid resuscitation w/ 30cc/kg IVF   - Send STAT labs Lactate/ BMP/Mg/phos/CBC/ trop   - EKG done at rapid w/ sinus tachycardia.  No obvious ST segment changes.   - Blood cultures are pending.   - On Flagyl and cipro.   - Pain control     8/26/2020:  Code Sepsis documented by Colorectal    Please clarify a diagnosis based on the above clinical criteria:  A) Sepsis present on admission ruled in  B) Sepsis ruled out  C) Unable to determine  D) Other ( Please specify condition)

## 2020-09-01 NOTE — CDI QUERY NOTE - NSCDIOTHERTXTBX3_GEN_ALL_CORE_FT
Documentation on chart:     PDX: Sigmoid diverticulitis with perforation and abscess  Procedure:  Exploratory laparotomy, Sigmoid resection with end colostomy     8/31:  imp: POD# 4 s/p Jason procedure  --will hold off discharge for now.  Examination suspicious for impending ileus.  --will check labs tomorrow, if not resolved, may consider KUB    Please clarify a diagnosis based on the above clinical criteria:  A) Post operative ileus ruled in  B) Post operative ileus ruled out  C) Unable to determine  D) Other ( Please specify condition)

## 2020-09-01 NOTE — PHYSICAL THERAPY INITIAL EVALUATION ADULT - PERTINENT HX OF CURRENT PROBLEM, REHAB EVAL
44 year old healthy female who presents with a couple of day history of abdominal pain. Pain is mostly localized in the RLQ although feels diffuse. It is intermittent in nature, worse with certain movements. Pain was associated with nausea and body aches., Pt has Acute diverticulitis with localized perforation,

## 2020-09-01 NOTE — PROGRESS NOTE ADULT - SUBJECTIVE AND OBJECTIVE BOX
Pt seen and examined at bedside, no acute events. Pt complained of nausea after PO antibiotics. Tolerating low fiber diet.  Denied fever, chills, nausea, vomiting or SOB overnight.       Vital Signs Last 24 Hrs  T(C): 37.6 (09-01-20 @ 09:57), Max: 37.6 (09-01-20 @ 09:57)  T(F): 99.7 (09-01-20 @ 09:57), Max: 99.7 (09-01-20 @ 09:57)  HR: 85 (09-01-20 @ 09:57) (83 - 92)  BP: 112/72 (09-01-20 @ 09:57) (109/77 - 121/82)  RR: 18 (09-01-20 @ 09:57) (16 - 18)  SpO2: 94% (09-01-20 @ 09:57) (94% - 96%)  Wt(kg): --                10.0<L>                139  | 28   | 7            9.97  >-----------< 321     ------------------------< 86                    30.6<L>                4.0  | 104  | 0.53                                                                      Ca 8.3<L> Mg 1.9   Ph 3.2      ,             10.3<L>                140  | 31   | 4<L>         8.83  >-----------< 306     ------------------------< 117<H>                 32.1<L>                3.6  | 105  | 0.61                                                                      Ca 8.1<L> Mg 1.9   Ph 2.3<L>          Physical Exam:  Pt is AAOx3  General: Well developed, in no acute distress.   Chest: Lungs clear, no rales, no rhonchi, no wheezes.   Heart: RR, no murmurs, no rubs, no gallops.   Abdomen: Soft, no tenderness, no masses, mild incisional tenderness upper abdomen, no rebound or guarding; stoma edematous, pink with minimal gas or stool. NII drain 65cc serosanguinous  Back: Normal curvature, no tenderness.   Neuro: Physiological, no localizing findings.   Skin: Normal, no rashes, no lesions noted.   Extremities: Warm, well perfused, no edema, Pulses intact

## 2020-09-01 NOTE — PHYSICAL THERAPY INITIAL EVALUATION ADULT - DISCHARGE DISPOSITION, PT EVAL
home/home w/ assist/Pt amb about 75' independently w/o any AD, pt is slow and steady on feet, Pt can amb with floor staff, pt does not require acute care PT, pt was suggested to take small walks in her room more frequently.

## 2020-09-01 NOTE — CDI QUERY NOTE - NSCDIOTHERTXTBX2_GEN_ALL_CORE_FT
Documentation on chart:    PDX: Sigmoid diverticulitis with perforation and abscess    Procedure:  Exploratory laparotomy, Sigmoid resection with end colostomy    8/29: Cxr with bilateral infiltrates representing either multifocal pneumonia of pulmonary congestion, favor the late   from: Xray     Chest 1 View-PORTABLE IMMEDIATE (08.29.20 @ 09:53) >  IMPRESSION:  Mild to moderate pulmonary vascular congestion and patchy airspace consolidation representing pulmonary edema versus multifocal pneumonia    Please clarify a diagnosis base don the above clinical criteria:  A) Post operative Pneumonia ruled in   B) Post operative Pneumonia ruled out  C) Unable to determine  D) Other ( Please specify condition)

## 2020-09-01 NOTE — PROGRESS NOTE ADULT - ASSESSMENT
44F with acute perforated diverticulitis POD5 s/p Jason procedure    Plan:   monitor vitals   pain control   nausea control   Diet: Low fiber  encourage IS use  encourage OOB/A  DVT/GI ppx   will minimize narcotic rx  monitor NII drain  monitor ostomy   will change oral abx to IV abx    Plan discussed with attending, Dr. Tucker

## 2020-09-02 ENCOUNTER — TRANSCRIPTION ENCOUNTER (OUTPATIENT)
Age: 45
End: 2020-09-02

## 2020-09-02 LAB — SURGICAL PATHOLOGY STUDY: SIGNIFICANT CHANGE UP

## 2020-09-03 ENCOUNTER — FORM ENCOUNTER (OUTPATIENT)
Age: 45
End: 2020-09-03

## 2020-09-08 ENCOUNTER — APPOINTMENT (OUTPATIENT)
Dept: COLORECTAL SURGERY | Facility: CLINIC | Age: 45
End: 2020-09-08
Payer: COMMERCIAL

## 2020-09-08 VITALS
SYSTOLIC BLOOD PRESSURE: 108 MMHG | RESPIRATION RATE: 16 BRPM | TEMPERATURE: 98.8 F | WEIGHT: 127 LBS | DIASTOLIC BLOOD PRESSURE: 73 MMHG | HEART RATE: 92 BPM | HEIGHT: 61 IN | BODY MASS INDEX: 23.98 KG/M2

## 2020-09-08 PROCEDURE — 99024 POSTOP FOLLOW-UP VISIT: CPT

## 2020-09-09 NOTE — DATA REVIEWED
[FreeTextEntry1] : surgical pathology reviewed - benign\par \par \par  Pathology             Final\par \par No Documents Attached\par \par \par \par \par   Tom Accession Number : [id number]\par \par HELEN, IGNACIA                       1\par \par \par \par Surgical Final Report\par \par \par \par \par Final Diagnosis\par \par Large bowel, rectosigmoid (segmental resection):\par - Diverticulitis with focal perforation and\par pericolonic abscess formation.\par - Acute serositis and serosal adhesions.\par \par Verified by: MATTHEW SHARIF M.D.\par (Electronic Signature)\par Reported on: 09/02/20 09:10 EDT, [facility], [address] [place] [address]\par Phone: [phone]   Fax: [phone]\par _________________________________________________________________\par \par Clinical History\par Perforated sigmoid diverticulitis\par \par Specimen(s) Submitted\par 1     Rectosigmoid colon\par \par Gross Description\par The specimen is received in formalin and the specimen container\par is labeled: Rectosigmoid colon.  It consists of 20.5 cm in length\par x 5.0 cm in average circumference segment of colon with a scant\par amount of attached adipose tissue.  The serosa is grey brown,\par focally hyperemic and dull.  Surface exudate is present.  It is\par received stapled both margins.  There is a 4.5 cm abnormal area\par that is 5.5 cm from the nearest margin.  Sectioning reveals\par multiple diverticular openings and a possible perforation site\par not is 4.5 cm from the nearest margin.  The mucosa is tan with\par normal folds the wall thickness ranges from 0.3-0.6 cm.\par Representative sections submitted.  Six cassettes.\par A-B. Abnormal area\par C-D.  Possible perforation site\par E-F. Random representative diverticula\par \par In addition to other data that may appear on the specimen\par containers, all labels have been inspected to confirm the\par presence of the patient's name and date of birth.\par Daysi Gutierreztarsha 08/31/2020 12:58\par \par  \par \par  Ordered by: MICAH BLAS       Collected/Examined: [id number] 08:00PM       \par Verified by: MICAH BLAS [id number] 01:08PM       \par  Result Communication: No patient communication needed at this time;\par Stage: Final       \par  Performed at: [facility]       Resulted: [id number] 09:10AM       Last Updated: 02Sep2020 01:08PM       Accession: [id number]

## 2020-09-09 NOTE — PHYSICAL EXAM
[Abdomen Masses] : No abdominal masses [Abdomen Tenderness] : ~T No ~M abdominal tenderness [Respiratory Effort] : normal respiratory effort [No Rash or Lesion] : No rash or lesion [Alert] : alert [Oriented to Person] : oriented to person [Oriented to Place] : oriented to place [Oriented to Time] : oriented to time [Calm] : calm [de-identified] : soft, NDNT, surgical incisions CDI, no erythema, drainage or odor noted. LLQ colostomy, red, patent, viable, protruding. parastomal skin intact. all staples were removed.  [de-identified] : well appearing, no acute distress [de-identified] : ROMARIO x4 [de-identified] : warm and dry

## 2020-09-09 NOTE — HISTORY OF PRESENT ILLNESS
[FreeTextEntry1] : 44 year old female who presents the office s/p Jason's procedure for perforated diverticulitis on 8/27/20 by Dr. Zavala.  Doing well.  Tolerating PO, ambulating.  Becoming more independent with colostomy care.  Denies fever/chills, n/v.

## 2020-09-14 ENCOUNTER — APPOINTMENT (OUTPATIENT)
Dept: COLORECTAL SURGERY | Facility: CLINIC | Age: 45
End: 2020-09-14
Payer: COMMERCIAL

## 2020-09-14 VITALS
DIASTOLIC BLOOD PRESSURE: 72 MMHG | SYSTOLIC BLOOD PRESSURE: 120 MMHG | BODY MASS INDEX: 23 KG/M2 | TEMPERATURE: 98.1 F | WEIGHT: 125 LBS | HEIGHT: 62 IN

## 2020-09-14 PROCEDURE — 99214 OFFICE O/P EST MOD 30 MIN: CPT | Mod: 24

## 2020-09-14 PROCEDURE — 99204 OFFICE O/P NEW MOD 45 MIN: CPT

## 2020-09-14 RX ORDER — TRAMADOL HYDROCHLORIDE 50 MG/1
50 TABLET, COATED ORAL EVERY 6 HOURS
Qty: 20 | Refills: 0 | Status: DISCONTINUED | COMMUNITY
Start: 2020-09-01 | End: 2020-09-14

## 2020-09-14 RX ORDER — AZITHROMYCIN 250 MG/1
250 TABLET, FILM COATED ORAL
Qty: 6 | Refills: 0 | Status: DISCONTINUED | COMMUNITY
Start: 2020-03-20 | End: 2020-09-14

## 2020-09-14 RX ORDER — ADAPALENE AND BENZOYL PEROXIDE 3; 25 MG/G; MG/G
0.3-2.5 GEL TOPICAL
Qty: 45 | Refills: 0 | Status: DISCONTINUED | COMMUNITY
Start: 2020-06-14 | End: 2020-09-14

## 2020-09-15 NOTE — HISTORY OF PRESENT ILLNESS
[FreeTextEntry1] : Patient is a 43 yo WF here after having an emergent Jason at [facility] on 8/27/20.  She states that a couple of days before surgery she went to urgent care with abdominal pain and she was sent to the ER.  The CT did show diverticulitis with localized perforation and they started her on antibiotics.  By day 2 with no improvement another CT scan was done and she was found to be perforated with abscess formation and getting septic and was taken to the OR by Dr. Zavala.  She presents for second opinion re. reversal.

## 2020-09-15 NOTE — PHYSICAL EXAM
[Normal Breath Sounds] : Normal breath sounds [Normal Heart Sounds] : normal heart sounds [Normal Rate and Rhythm] : normal rate and rhythm [Alert] : alert [No Rash or Lesion] : No rash or lesion [Oriented to Person] : oriented to person [Oriented to Place] : oriented to place [Calm] : calm [Oriented to Time] : oriented to time [de-identified] : round, NT/ND, +BS, ostomy in place [de-identified] : well nourished female [de-identified] : NC/AT [de-identified] : DUANE/+ROM [de-identified] : Intact

## 2020-09-15 NOTE — ASSESSMENT
[FreeTextEntry1] : Patient is a pleasant 44 year old female who presents for consultation today regarding reversal of Jason procedure.\par \par In late August the patient developed vague right-sided lower abdominal pain. The pain increased and she went to an Urgent Care Center where she was sent for a CAT scan. She was told she had diverticulitis and was admitted for intravenous antibiotic therapy. She developed increasing pain and a repeat CAT scan was performed. She was subsequently taken emergently to the operating room for an open Jason procedure on August 27. She was found to have perforated sigmoid diverticulitis. Patient currently is recuperating well\par \par She had a tubal ligation in the past and no other prior abdominal surgery. She has had breast enhancement surgery. She is otherwise healthy.\par \par I explained the etiology and treatment of perforated diverticulitis to the patient and her  who accompanied her. I told them that I would wait at least 3 months and that the patient would then require a colonoscopy via the ostomy and anus.  Any time thereafter she could have a reversal of Jason. I would do this open and we discussed anticipated operative time, hospital stay and time to complete recuperation. Risks, alternatives and benefits including but not limited to an anastomotic leak, wound infection and deep venous thrombosis were discussed.\par \par All questions were answered and she would like me to assume her surgical care. I will reassess her wound in 4-6 weeks.

## 2020-09-22 ENCOUNTER — APPOINTMENT (OUTPATIENT)
Dept: FAMILY MEDICINE | Facility: CLINIC | Age: 45
End: 2020-09-22
Payer: COMMERCIAL

## 2020-09-22 VITALS
RESPIRATION RATE: 16 BRPM | SYSTOLIC BLOOD PRESSURE: 100 MMHG | TEMPERATURE: 98.4 F | DIASTOLIC BLOOD PRESSURE: 62 MMHG | HEIGHT: 62 IN | HEART RATE: 86 BPM | OXYGEN SATURATION: 98 % | WEIGHT: 128 LBS | BODY MASS INDEX: 23.55 KG/M2

## 2020-09-22 DIAGNOSIS — F17.200 NICOTINE DEPENDENCE, UNSPECIFIED, UNCOMPLICATED: ICD-10-CM

## 2020-09-22 DIAGNOSIS — Z23 ENCOUNTER FOR IMMUNIZATION: ICD-10-CM

## 2020-09-22 DIAGNOSIS — D64.9 ANEMIA, UNSPECIFIED: ICD-10-CM

## 2020-09-22 DIAGNOSIS — Z93.3 COLOSTOMY STATUS: ICD-10-CM

## 2020-09-22 DIAGNOSIS — R93.89 ABNORMAL FINDINGS ON DIAGNOSTIC IMAGING OF OTHER SPECIFIED BODY STRUCTURES: ICD-10-CM

## 2020-09-22 DIAGNOSIS — K57.80 DIVERTICULITIS OF INTESTINE, PART UNSPECIFIED, WITH PERFORATION AND ABSCESS W/OUT BLEEDING: ICD-10-CM

## 2020-09-22 PROCEDURE — G0009: CPT

## 2020-09-22 PROCEDURE — 90686 IIV4 VACC NO PRSV 0.5 ML IM: CPT

## 2020-09-22 PROCEDURE — 90732 PPSV23 VACC 2 YRS+ SUBQ/IM: CPT

## 2020-09-22 PROCEDURE — 99215 OFFICE O/P EST HI 40 MIN: CPT | Mod: 25

## 2020-09-22 PROCEDURE — 99406 BEHAV CHNG SMOKING 3-10 MIN: CPT | Mod: 59

## 2020-09-22 PROCEDURE — 36415 COLL VENOUS BLD VENIPUNCTURE: CPT

## 2020-09-22 PROCEDURE — 90472 IMMUNIZATION ADMIN EACH ADD: CPT

## 2020-09-25 NOTE — ED ADULT TRIAGE NOTE - DIRECT TO ROOM CARE INITIATED:
25-Aug-2020 10:29 Implemented All Universal Safety Interventions:  Thor to call system. Call bell, personal items and telephone within reach. Instruct patient to call for assistance. Room bathroom lighting operational. Non-slip footwear when patient is off stretcher. Physically safe environment: no spills, clutter or unnecessary equipment. Stretcher in lowest position, wheels locked, appropriate side rails in place.

## 2020-09-26 LAB
CULTURE RESULTS: SIGNIFICANT CHANGE UP
SPECIMEN SOURCE: SIGNIFICANT CHANGE UP

## 2020-09-27 PROBLEM — K57.80 DIVERTICULITIS OF INTESTINE WITH PERFORATION: Status: ACTIVE | Noted: 2020-09-01

## 2020-09-27 PROBLEM — Z23 NEEDS FLU SHOT: Status: ACTIVE | Noted: 2020-09-22

## 2020-09-27 PROBLEM — D64.9 ANEMIA: Status: ACTIVE | Noted: 2020-09-22

## 2020-09-27 PROBLEM — Z93.3 COLOSTOMY IN PLACE: Status: ACTIVE | Noted: 2020-09-03

## 2020-09-27 PROBLEM — F17.200 TOBACCO DEPENDENCE: Status: ACTIVE | Noted: 2019-10-09

## 2020-09-27 PROBLEM — R93.89 ABNORMAL CXR: Status: ACTIVE | Noted: 2020-09-22

## 2020-09-27 PROBLEM — Z23 NEED FOR PNEUMOCOCCAL VACCINE: Status: ACTIVE | Noted: 2020-09-22

## 2020-09-27 LAB
ALBUMIN SERPL ELPH-MCNC: 4.2 G/DL
ALP BLD-CCNC: 52 U/L
ALT SERPL-CCNC: 7 U/L
ANION GAP SERPL CALC-SCNC: 14 MMOL/L
AST SERPL-CCNC: 16 U/L
BASOPHILS # BLD AUTO: 0.04 K/UL
BASOPHILS NFR BLD AUTO: 0.5 %
BILIRUB SERPL-MCNC: 0.2 MG/DL
BUN SERPL-MCNC: 11 MG/DL
CALCIUM SERPL-MCNC: 9.3 MG/DL
CHLORIDE SERPL-SCNC: 100 MMOL/L
CO2 SERPL-SCNC: 24 MMOL/L
CREAT SERPL-MCNC: 0.74 MG/DL
EOSINOPHIL # BLD AUTO: 0.1 K/UL
EOSINOPHIL NFR BLD AUTO: 1.3 %
FERRITIN SERPL-MCNC: 53 NG/ML
FOLATE SERPL-MCNC: 12.7 NG/ML
GLUCOSE SERPL-MCNC: 73 MG/DL
HCT VFR BLD CALC: 40 %
HGB BLD-MCNC: 12.6 G/DL
IMM GRANULOCYTES NFR BLD AUTO: 0.4 %
IRON SATN MFR SERPL: 33 %
IRON SERPL-MCNC: 100 UG/DL
LYMPHOCYTES # BLD AUTO: 2.5 K/UL
LYMPHOCYTES NFR BLD AUTO: 33.5 %
MAN DIFF?: NORMAL
MCHC RBC-ENTMCNC: 29.6 PG
MCHC RBC-ENTMCNC: 31.5 GM/DL
MCV RBC AUTO: 94.1 FL
MONOCYTES # BLD AUTO: 0.55 K/UL
MONOCYTES NFR BLD AUTO: 7.4 %
NEUTROPHILS # BLD AUTO: 4.25 K/UL
NEUTROPHILS NFR BLD AUTO: 56.9 %
PLATELET # BLD AUTO: 232 K/UL
POTASSIUM SERPL-SCNC: 4.4 MMOL/L
PROT SERPL-MCNC: 6.4 G/DL
RBC # BLD: 4.25 M/UL
RBC # BLD: 4.25 M/UL
RBC # FLD: 13.1 %
RETICS # AUTO: 1.8 %
RETICS AGGREG/RBC NFR: 74.6 K/UL
SODIUM SERPL-SCNC: 137 MMOL/L
TIBC SERPL-MCNC: 305 UG/DL
TRANSFERRIN SERPL-MCNC: 254 MG/DL
UIBC SERPL-MCNC: 205 UG/DL
VIT B12 SERPL-MCNC: 269 PG/ML
WBC # FLD AUTO: 7.47 K/UL

## 2020-09-27 NOTE — PHYSICAL EXAM
[Soft] : abdomen soft [Non Tender] : non-tender [Non-distended] : non-distended [No HSM] : no HSM [Normal Bowel Sounds] : normal bowel sounds [Alert and Oriented x3] : oriented to person, place, and time [Normal] : affect was normal and insight and judgment were intact [de-identified] : + colostomy

## 2020-09-27 NOTE — REVIEW OF SYSTEMS
[Negative] : Psychiatric [Abdominal Pain] : no abdominal pain [Nausea] : no nausea [Constipation] : no constipation [Diarrhea] : diarrhea [Vomiting] : no vomiting [Heartburn] : no heartburn [Melena] : no melena [FreeTextEntry7] : + colostomy

## 2020-09-27 NOTE — HEALTH RISK ASSESSMENT
[No] : In the past 12 months have you used drugs other than those required for medical reasons? No [0] : 2) Feeling down, depressed, or hopeless: Not at all (0) [] : No [Audit-CScore] : 0 [de-identified] : walking  [de-identified] : healthy  [[id number]] : 0

## 2020-09-27 NOTE — HISTORY OF PRESENT ILLNESS
[FreeTextEntry1] : cc; f/u Hospitalization  8/25 - 9/01/20,s/p Sx diverticulitis and partial collectomy 8/27, needs a FLu shot , f/u ANemia, abn CXR [de-identified] : Patient presented to f/u after emergent partial collectomy  on 8/27/20, [facility], + colostomy, SHe is doing well, denies fever,chills,no SOB,no ABd pain, Pt got 2nd opinion re: further treatment, During Hospitalization pt had abnormal CXR +b/l  infiltrates, deneis cough, fever, chill, no SOB,  her Hb was low - asymptomatic. Anemia.Pt quit smoking during hospitalization.

## 2020-09-27 NOTE — COUNSELING
[Tobacco Use Cessation Intermediate Greater Than 3 Minutes Up to 10 Minutes] : Tobacco Use Cessation Intermediate Greater Than 3 Minutes Up to 10 Minutes [Good understanding] : Patient has a good understanding of lifestyle changes and steps needed to achieve self management goal [FreeTextEntry1] : 4

## 2020-10-12 ENCOUNTER — APPOINTMENT (OUTPATIENT)
Dept: COLORECTAL SURGERY | Facility: CLINIC | Age: 45
End: 2020-10-12
Payer: COMMERCIAL

## 2020-10-12 PROCEDURE — 99024 POSTOP FOLLOW-UP VISIT: CPT

## 2020-11-17 NOTE — HISTORY OF PRESENT ILLNESS
[FreeTextEntry1] : 44-year-old white female who presents for followup visit. On August 27, 2020 she underwent an urgent Jason procedure for perforated diverticulitis.\par \par Patient looks and feels excellently. She has no incisional pain. She is anxious to have her ostomy reversed.\par \par Physical examination reveals that her long lower midline scar is fully healed. She has an intact ostomy appliance in place on her left sided stoma.\par \par The patient requires a preoperative colonoscopy via the anus and ostomy. We will schedule the colonoscopy on Wednesday and reverse her Jason the next day on Thursday. This will be done in early December 2020

## 2020-11-30 ENCOUNTER — APPOINTMENT (OUTPATIENT)
Dept: DISASTER EMERGENCY | Facility: CLINIC | Age: 45
End: 2020-11-30

## 2020-11-30 ENCOUNTER — OUTPATIENT (OUTPATIENT)
Dept: OUTPATIENT SERVICES | Facility: HOSPITAL | Age: 45
LOS: 1 days | End: 2020-11-30
Payer: COMMERCIAL

## 2020-11-30 VITALS
RESPIRATION RATE: 17 BRPM | TEMPERATURE: 98 F | DIASTOLIC BLOOD PRESSURE: 80 MMHG | HEIGHT: 62 IN | HEART RATE: 75 BPM | WEIGHT: 132.06 LBS | OXYGEN SATURATION: 100 % | SYSTOLIC BLOOD PRESSURE: 118 MMHG

## 2020-11-30 DIAGNOSIS — Z11.59 ENCOUNTER FOR SCREENING FOR OTHER VIRAL DISEASES: ICD-10-CM

## 2020-11-30 DIAGNOSIS — K57.92 DIVERTICULITIS OF INTESTINE, PART UNSPECIFIED, WITHOUT PERFORATION OR ABSCESS WITHOUT BLEEDING: ICD-10-CM

## 2020-11-30 DIAGNOSIS — Z01.818 ENCOUNTER FOR OTHER PREPROCEDURAL EXAMINATION: ICD-10-CM

## 2020-11-30 DIAGNOSIS — Z98.51 TUBAL LIGATION STATUS: Chronic | ICD-10-CM

## 2020-11-30 DIAGNOSIS — Z93.3 COLOSTOMY STATUS: Chronic | ICD-10-CM

## 2020-11-30 DIAGNOSIS — K57.80 DIVERTICULITIS OF INTESTINE, PART UNSPECIFIED, WITH PERFORATION AND ABSCESS WITHOUT BLEEDING: ICD-10-CM

## 2020-11-30 DIAGNOSIS — Z98.82 BREAST IMPLANT STATUS: Chronic | ICD-10-CM

## 2020-11-30 LAB — SARS-COV-2 RNA SPEC QL NAA+PROBE: SIGNIFICANT CHANGE UP

## 2020-11-30 PROCEDURE — 86901 BLOOD TYPING SEROLOGIC RH(D): CPT

## 2020-11-30 PROCEDURE — 85027 COMPLETE CBC AUTOMATED: CPT

## 2020-11-30 PROCEDURE — 80048 BASIC METABOLIC PNL TOTAL CA: CPT

## 2020-11-30 PROCEDURE — 83036 HEMOGLOBIN GLYCOSYLATED A1C: CPT

## 2020-11-30 PROCEDURE — 86850 RBC ANTIBODY SCREEN: CPT

## 2020-11-30 PROCEDURE — 86900 BLOOD TYPING SEROLOGIC ABO: CPT

## 2020-11-30 PROCEDURE — U0003: CPT

## 2020-11-30 PROCEDURE — G0463: CPT

## 2020-11-30 NOTE — H&P PST ADULT - NSICDXPASTSURGICALHX_GEN_ALL_CORE_FT
PAST SURGICAL HISTORY:  H/O breast augmentation 2/2019    H/O tubal ligation 2006    Status post Jason procedure 8/30/2020

## 2020-11-30 NOTE — H&P PST ADULT - ASSESSMENT
CAPRINI SCORE [CLOT]    AGE RELATED RISK FACTORS                                                       MOBILITY RELATED FACTORS  [x ] Age 41-60 years                                            (1 Point)                  [ ] Bed rest                                                        (1 Point)  [ ] Age: 61-74 years                                           (2 Points)                 [ ] Plaster cast                                                   (2 Points)  [ ] Age= 75 years                                              (3 Points)                 [ ] Bed bound for more than 72 hours                 (2 Points)    DISEASE RELATED RISK FACTORS                                               GENDER SPECIFIC FACTORS  [ ] Edema in the lower extremities                       (1 Point)                  [ ] Pregnancy                                                     (1 Point)  [ ] Varicose veins                                               (1 Point)                  [ ] Post-partum < 6 weeks                                   (1 Point)             [ ] BMI > 25 Kg/m2                                            (1 Point)                  [ ] Hormonal therapy  or oral contraception          (1 Point)                 [ ] Sepsis (in the previous month)                        (1 Point)                  [ ] History of pregnancy complications                 (1 point)  [ ] Pneumonia or serious lung disease                                               [ ] Unexplained or recurrent                     (1 Point)           (in the previous month)                               (1 Point)  [ ] Abnormal pulmonary function test                     (1 Point)                 SURGERY RELATED RISK FACTORS  [ ] Acute myocardial infarction                              (1 Point)                 [ ]  Section                                             (1 Point)  [ ] Congestive heart failure (in the previous month)  (1 Point)               [ ] Minor surgery                                                  (1 Point)   [x ] Inflammatory bowel disease                             (1 Point)                 [ ] Arthroscopic surgery                                        (2 Points)  [ ] Central venous access                                      (2 Points)                [x ] General surgery lasting more than 45 minutes   (2 Points)       [ ] Stroke (in the previous month)                          (5 Points)               [ ] Elective arthroplasty                                         (5 Points)                                                                                                                                               HEMATOLOGY RELATED FACTORS                                                 TRAUMA RELATED RISK FACTORS  [ ] Prior episodes of VTE                                     (3 Points)                [ ] Fracture of the hip, pelvis, or leg                       (5 Points)  [ ] Positive family history for VTE                         (3 Points)                 [ ] Acute spinal cord injury (in the previous month)  (5 Points)  [ ] Prothrombin 70653 A                                     (3 Points)                 [ ] Paralysis  (less than 1 month)                             (5 Points)  [ ] Factor V Leiden                                             (3 Points)                  [ ] Multiple Trauma within 1 month                        (5 Points)  [ ] Lupus anticoagulants                                     (3 Points)                                                           [ ] Anticardiolipin antibodies                               (3 Points)                                                       [ ] High homocysteine in the blood                      (3 Points)                                             [ ] Other congenital or acquired thrombophilia      (3 Points)                                                [ ] Heparin induced thrombocytopenia                  (3 Points)                                          Total Score [       4   ]    Caprini Score 0 - 2:  Low Risk, No VTE Prophylaxis required for most patients, encourage ambulation  Caprini Score 3 - 6:  At Risk, pharmacologic VTE prophylaxis is indicated for most patients (in the absence of a contraindication)  Caprini Score Greater than or = 7:  High Risk, pharmacologic VTE prophylaxis is indicated for most patients (in the absence of a contraindication)

## 2020-11-30 NOTE — H&P PST ADULT - HISTORY OF PRESENT ILLNESS
colonoscopy 12/2/2020 in office   44 yr old female had acute diverticulitis needs emergent ostomy creation.  Now for reversal.    colonoscopy 12/2/2020 in office  surgery 12/3    ***COVID  denies travel outside [place]  denies s/s   denies known exposure  swab 11/30 [facility]   44 yr old female had acute diverticulitis needed  emergent ostomy creation.  Now for reversal.    colonoscopy 12/2/2020 in office  surgery 12/3    ***COVID  denies travel outside [place]  denies s/s   denies known exposure  swab 11/30 [facility]

## 2020-12-02 ENCOUNTER — TRANSCRIPTION ENCOUNTER (OUTPATIENT)
Age: 45
End: 2020-12-02

## 2020-12-02 ENCOUNTER — APPOINTMENT (OUTPATIENT)
Dept: COLORECTAL SURGERY | Facility: CLINIC | Age: 45
End: 2020-12-02
Payer: COMMERCIAL

## 2020-12-02 DIAGNOSIS — K57.90 DIVERTICULOSIS OF INTESTINE, PART UNSPECIFIED, W/OUT PERFORATION OR ABSCESS W/OUT BLEEDING: ICD-10-CM

## 2020-12-02 PROCEDURE — 44388 COLONOSCOPY THRU STOMA SPX: CPT

## 2020-12-02 PROCEDURE — 99072 ADDL SUPL MATRL&STAF TM PHE: CPT

## 2020-12-03 ENCOUNTER — RESULT REVIEW (OUTPATIENT)
Age: 45
End: 2020-12-03

## 2020-12-03 ENCOUNTER — APPOINTMENT (OUTPATIENT)
Dept: COLORECTAL SURGERY | Facility: HOSPITAL | Age: 45
End: 2020-12-03
Payer: COMMERCIAL

## 2020-12-03 ENCOUNTER — INPATIENT (INPATIENT)
Facility: HOSPITAL | Age: 45
LOS: 3 days | Discharge: HOME HEALTH | DRG: 330 | End: 2020-12-07
Attending: SURGERY | Admitting: SURGERY
Payer: COMMERCIAL

## 2020-12-03 VITALS
DIASTOLIC BLOOD PRESSURE: 75 MMHG | OXYGEN SATURATION: 100 % | RESPIRATION RATE: 16 BRPM | SYSTOLIC BLOOD PRESSURE: 113 MMHG | HEIGHT: 62 IN | WEIGHT: 132.06 LBS | TEMPERATURE: 98 F | HEART RATE: 98 BPM

## 2020-12-03 DIAGNOSIS — Z98.82 BREAST IMPLANT STATUS: Chronic | ICD-10-CM

## 2020-12-03 DIAGNOSIS — Z98.51 TUBAL LIGATION STATUS: Chronic | ICD-10-CM

## 2020-12-03 DIAGNOSIS — K57.80 DIVERTICULITIS OF INTESTINE, PART UNSPECIFIED, WITH PERFORATION AND ABSCESS WITHOUT BLEEDING: ICD-10-CM

## 2020-12-03 DIAGNOSIS — Z93.3 COLOSTOMY STATUS: Chronic | ICD-10-CM

## 2020-12-03 LAB — GLUCOSE BLDC GLUCOMTR-MCNC: 131 MG/DL — HIGH (ref 70–99)

## 2020-12-03 PROCEDURE — 52005 CYSTO W/URTRL CATHJ: CPT

## 2020-12-03 PROCEDURE — 88304 TISSUE EXAM BY PATHOLOGIST: CPT | Mod: 26

## 2020-12-03 PROCEDURE — 88307 TISSUE EXAM BY PATHOLOGIST: CPT | Mod: 26

## 2020-12-03 PROCEDURE — 44626 REPAIR BOWEL OPENING: CPT | Mod: 58

## 2020-12-03 PROCEDURE — 97605 NEG PRS WND THER DME<=50SQCM: CPT | Mod: 58

## 2020-12-03 PROCEDURE — 45300 PROCTOSIGMOIDOSCOPY DX: CPT | Mod: 58

## 2020-12-03 RX ORDER — KETOROLAC TROMETHAMINE 30 MG/ML
15 SYRINGE (ML) INJECTION EVERY 6 HOURS
Refills: 0 | Status: DISCONTINUED | OUTPATIENT
Start: 2020-12-03 | End: 2020-12-04

## 2020-12-03 RX ORDER — HYDROMORPHONE HYDROCHLORIDE 2 MG/ML
0.5 INJECTION INTRAMUSCULAR; INTRAVENOUS; SUBCUTANEOUS EVERY 4 HOURS
Refills: 0 | Status: DISCONTINUED | OUTPATIENT
Start: 2020-12-03 | End: 2020-12-05

## 2020-12-03 RX ORDER — OXYCODONE HYDROCHLORIDE 5 MG/1
10 TABLET ORAL
Refills: 0 | Status: DISCONTINUED | OUTPATIENT
Start: 2020-12-03 | End: 2020-12-03

## 2020-12-03 RX ORDER — CELECOXIB 200 MG/1
400 CAPSULE ORAL ONCE
Refills: 0 | Status: COMPLETED | OUTPATIENT
Start: 2020-12-03 | End: 2020-12-03

## 2020-12-03 RX ORDER — ONDANSETRON 8 MG/1
4 TABLET, FILM COATED ORAL EVERY 6 HOURS
Refills: 0 | Status: DISCONTINUED | OUTPATIENT
Start: 2020-12-03 | End: 2020-12-04

## 2020-12-03 RX ORDER — ENOXAPARIN SODIUM 100 MG/ML
40 INJECTION SUBCUTANEOUS DAILY
Refills: 0 | Status: DISCONTINUED | OUTPATIENT
Start: 2020-12-03 | End: 2020-12-07

## 2020-12-03 RX ORDER — SODIUM CHLORIDE 9 MG/ML
1000 INJECTION, SOLUTION INTRAVENOUS
Refills: 0 | Status: DISCONTINUED | OUTPATIENT
Start: 2020-12-03 | End: 2020-12-04

## 2020-12-03 RX ORDER — LIDOCAINE HCL 20 MG/ML
0.2 VIAL (ML) INJECTION ONCE
Refills: 0 | Status: COMPLETED | OUTPATIENT
Start: 2020-12-03 | End: 2020-12-03

## 2020-12-03 RX ORDER — SODIUM CHLORIDE 9 MG/ML
3 INJECTION INTRAMUSCULAR; INTRAVENOUS; SUBCUTANEOUS EVERY 8 HOURS
Refills: 0 | Status: DISCONTINUED | OUTPATIENT
Start: 2020-12-03 | End: 2020-12-03

## 2020-12-03 RX ORDER — NALOXONE HYDROCHLORIDE 4 MG/.1ML
0.1 SPRAY NASAL
Refills: 0 | Status: DISCONTINUED | OUTPATIENT
Start: 2020-12-03 | End: 2020-12-04

## 2020-12-03 RX ORDER — MORPHINE SULFATE 50 MG/1
0.1 CAPSULE, EXTENDED RELEASE ORAL ONCE
Refills: 0 | Status: DISCONTINUED | OUTPATIENT
Start: 2020-12-03 | End: 2020-12-04

## 2020-12-03 RX ORDER — CHLORHEXIDINE GLUCONATE 213 G/1000ML
1 SOLUTION TOPICAL ONCE
Refills: 0 | Status: DISCONTINUED | OUTPATIENT
Start: 2020-12-03 | End: 2020-12-03

## 2020-12-03 RX ORDER — HYDROMORPHONE HYDROCHLORIDE 2 MG/ML
0.25 INJECTION INTRAMUSCULAR; INTRAVENOUS; SUBCUTANEOUS ONCE
Refills: 0 | Status: DISCONTINUED | OUTPATIENT
Start: 2020-12-03 | End: 2020-12-03

## 2020-12-03 RX ORDER — CEFOTETAN DISODIUM 1 G
2 VIAL (EA) INJECTION ONCE
Refills: 0 | Status: DISCONTINUED | OUTPATIENT
Start: 2020-12-03 | End: 2020-12-03

## 2020-12-03 RX ORDER — GABAPENTIN 400 MG/1
600 CAPSULE ORAL ONCE
Refills: 0 | Status: COMPLETED | OUTPATIENT
Start: 2020-12-03 | End: 2020-12-03

## 2020-12-03 RX ORDER — ACETAMINOPHEN 500 MG
1000 TABLET ORAL EVERY 6 HOURS
Refills: 0 | Status: COMPLETED | OUTPATIENT
Start: 2020-12-03 | End: 2020-12-04

## 2020-12-03 RX ORDER — OXYCODONE HYDROCHLORIDE 5 MG/1
5 TABLET ORAL
Refills: 0 | Status: DISCONTINUED | OUTPATIENT
Start: 2020-12-03 | End: 2020-12-03

## 2020-12-03 RX ADMIN — Medication 1000 MILLIGRAM(S): at 18:11

## 2020-12-03 RX ADMIN — SODIUM CHLORIDE 3 MILLILITER(S): 9 INJECTION INTRAMUSCULAR; INTRAVENOUS; SUBCUTANEOUS at 05:58

## 2020-12-03 RX ADMIN — Medication 15 MILLIGRAM(S): at 20:44

## 2020-12-03 RX ADMIN — CELECOXIB 400 MILLIGRAM(S): 200 CAPSULE ORAL at 05:58

## 2020-12-03 RX ADMIN — SODIUM CHLORIDE 100 MILLILITER(S): 9 INJECTION, SOLUTION INTRAVENOUS at 22:07

## 2020-12-03 RX ADMIN — GABAPENTIN 600 MILLIGRAM(S): 400 CAPSULE ORAL at 05:49

## 2020-12-03 RX ADMIN — HYDROMORPHONE HYDROCHLORIDE 0.25 MILLIGRAM(S): 2 INJECTION INTRAMUSCULAR; INTRAVENOUS; SUBCUTANEOUS at 13:27

## 2020-12-03 RX ADMIN — HYDROMORPHONE HYDROCHLORIDE 0.25 MILLIGRAM(S): 2 INJECTION INTRAMUSCULAR; INTRAVENOUS; SUBCUTANEOUS at 13:12

## 2020-12-03 RX ADMIN — Medication 400 MILLIGRAM(S): at 23:36

## 2020-12-03 RX ADMIN — Medication 400 MILLIGRAM(S): at 17:41

## 2020-12-03 RX ADMIN — CELECOXIB 400 MILLIGRAM(S): 200 CAPSULE ORAL at 05:48

## 2020-12-03 RX ADMIN — Medication 15 MILLIGRAM(S): at 20:14

## 2020-12-03 NOTE — PROGRESS NOTE ADULT - SUBJECTIVE AND OBJECTIVE BOX
SURGERY POST OP CHECK    SURGEON:   SURGERY: HARTMANNS REVERSAL      SUBJECTIVE:  Patient seen for post op check, in good spirits. Reports feeling ok and pain controlled.   Denies nausea, vomiting, chest pain, shortness of breath       MEDICATIONS  (STANDING):  acetaminophen  IVPB .. 1000 milliGRAM(s) IV Intermittent every 6 hours  enoxaparin Injectable 40 milliGRAM(s) SubCutaneous daily  ketorolac   Injectable 15 milliGRAM(s) IV Push every 6 hours  lactated ringers. 1000 milliLiter(s) (100 mL/Hr) IV Continuous <Continuous>  morphine PF Spinal 0.1 milliGRAM(s) IntraThecal. once    MEDICATIONS  (PRN):  HYDROmorphone  Injectable 0.5 milliGRAM(s) IV Push every 4 hours PRN Severe Pain (7 - 10)  naloxone Injectable 0.1 milliGRAM(s) IV Push every 3 minutes PRN For ANY of the following changes in patient status:  A. RR LESS THAN 10 breaths per minute, B. Oxygen saturation LESS THAN 90%, C. Sedation score of 6  ondansetron Injectable 4 milliGRAM(s) IV Push every 6 hours PRN Nausea      OBJECTIVE:    Vital Signs Last 24 Hrs  T(C): 37 (03 Dec 2020 16:19), Max: 37 (03 Dec 2020 16:19)  T(F): 98.6 (03 Dec 2020 16:19), Max: 98.6 (03 Dec 2020 16:19)  HR: 85 (03 Dec 2020 16:19) (65 - 98)  BP: 104/68 (03 Dec 2020 16:19) (92/52 - 113/75)  BP(mean): 70 (03 Dec 2020 15:00) (66 - 81)  RR: 16 (03 Dec 2020 16:19) (12 - 18)  SpO2: 95% (03 Dec 2020 16:19) (95% - 100%)        I&O's Detail    03 Dec 2020 07:01  -  03 Dec 2020 16:37  --------------------------------------------------------  IN:    Lactated Ringers: 200 mL  Total IN: 200 mL    OUT:    Indwelling Catheter - Urethral (mL): 185 mL  Total OUT: 185 mL    Total NET: 15 mL    Daily Height in cm: 157.48 (03 Dec 2020 08:56)    Daily     Physical Exam  General: NAd, A&Ox3  Resp: Normal work of breathing  Abd: Soft, NT, provena in midline incision  Ext: No edema

## 2020-12-03 NOTE — BRIEF OPERATIVE NOTE - OPERATION/FINDINGS
Jason's reversal through midline exploratory laparotomy.  Splenic flexure taken down and colon mobilized.  Descending colon anastomosed to rectum using 29mm EEA at 13cm from the anal verge.  Negative leak test.

## 2020-12-03 NOTE — PROGRESS NOTE ADULT - ASSESSMENT
44 yr old female had acute diverticulitis needed  emergent ostomy creation, now s/p hartmanns reversal. Hemodynamically stable on floor.  *ERAS PROTOCOL     Diet: Sips, will advance as tolerated  DVT: Lovenox  f/u AM labs   Close monitoring over night  All questions answered    [phone]

## 2020-12-04 LAB
ANION GAP SERPL CALC-SCNC: 13 MMOL/L — SIGNIFICANT CHANGE UP (ref 5–17)
BUN SERPL-MCNC: 16 MG/DL — SIGNIFICANT CHANGE UP (ref 7–23)
CALCIUM SERPL-MCNC: 8.2 MG/DL — LOW (ref 8.4–10.5)
CHLORIDE SERPL-SCNC: 102 MMOL/L — SIGNIFICANT CHANGE UP (ref 96–108)
CO2 SERPL-SCNC: 19 MMOL/L — LOW (ref 22–31)
CREAT SERPL-MCNC: 0.77 MG/DL — SIGNIFICANT CHANGE UP (ref 0.5–1.3)
GLUCOSE SERPL-MCNC: 76 MG/DL — SIGNIFICANT CHANGE UP (ref 70–99)
HCT VFR BLD CALC: 36.4 % — SIGNIFICANT CHANGE UP (ref 34.5–45)
HGB BLD-MCNC: 11.6 G/DL — SIGNIFICANT CHANGE UP (ref 11.5–15.5)
MAGNESIUM SERPL-MCNC: 2 MG/DL — SIGNIFICANT CHANGE UP (ref 1.6–2.6)
MCHC RBC-ENTMCNC: 28.9 PG — SIGNIFICANT CHANGE UP (ref 27–34)
MCHC RBC-ENTMCNC: 31.9 GM/DL — LOW (ref 32–36)
MCV RBC AUTO: 90.8 FL — SIGNIFICANT CHANGE UP (ref 80–100)
NRBC # BLD: 0 /100 WBCS — SIGNIFICANT CHANGE UP (ref 0–0)
PHOSPHATE SERPL-MCNC: 3.4 MG/DL — SIGNIFICANT CHANGE UP (ref 2.5–4.5)
PLATELET # BLD AUTO: 210 K/UL — SIGNIFICANT CHANGE UP (ref 150–400)
POTASSIUM SERPL-MCNC: 4.4 MMOL/L — SIGNIFICANT CHANGE UP (ref 3.5–5.3)
POTASSIUM SERPL-SCNC: 4.4 MMOL/L — SIGNIFICANT CHANGE UP (ref 3.5–5.3)
RBC # BLD: 4.01 M/UL — SIGNIFICANT CHANGE UP (ref 3.8–5.2)
RBC # FLD: 12.8 % — SIGNIFICANT CHANGE UP (ref 10.3–14.5)
SODIUM SERPL-SCNC: 134 MMOL/L — LOW (ref 135–145)
WBC # BLD: 14.34 K/UL — HIGH (ref 3.8–10.5)
WBC # FLD AUTO: 14.34 K/UL — HIGH (ref 3.8–10.5)

## 2020-12-04 RX ORDER — ACETAMINOPHEN 500 MG
1000 TABLET ORAL EVERY 6 HOURS
Refills: 0 | Status: COMPLETED | OUTPATIENT
Start: 2020-12-04 | End: 2020-12-05

## 2020-12-04 RX ORDER — DEXTROSE MONOHYDRATE, SODIUM CHLORIDE, AND POTASSIUM CHLORIDE 50; .745; 4.5 G/1000ML; G/1000ML; G/1000ML
1000 INJECTION, SOLUTION INTRAVENOUS
Refills: 0 | Status: DISCONTINUED | OUTPATIENT
Start: 2020-12-04 | End: 2020-12-05

## 2020-12-04 RX ORDER — KETOROLAC TROMETHAMINE 30 MG/ML
15 SYRINGE (ML) INJECTION EVERY 6 HOURS
Refills: 0 | Status: DISCONTINUED | OUTPATIENT
Start: 2020-12-04 | End: 2020-12-05

## 2020-12-04 RX ADMIN — ENOXAPARIN SODIUM 40 MILLIGRAM(S): 100 INJECTION SUBCUTANEOUS at 12:58

## 2020-12-04 RX ADMIN — DEXTROSE MONOHYDRATE, SODIUM CHLORIDE, AND POTASSIUM CHLORIDE 100 MILLILITER(S): 50; .745; 4.5 INJECTION, SOLUTION INTRAVENOUS at 08:37

## 2020-12-04 RX ADMIN — HYDROMORPHONE HYDROCHLORIDE 0.5 MILLIGRAM(S): 2 INJECTION INTRAMUSCULAR; INTRAVENOUS; SUBCUTANEOUS at 19:39

## 2020-12-04 RX ADMIN — Medication 400 MILLIGRAM(S): at 23:20

## 2020-12-04 RX ADMIN — Medication 15 MILLIGRAM(S): at 05:15

## 2020-12-04 RX ADMIN — Medication 1000 MILLIGRAM(S): at 18:30

## 2020-12-04 RX ADMIN — Medication 15 MILLIGRAM(S): at 15:37

## 2020-12-04 RX ADMIN — Medication 15 MILLIGRAM(S): at 21:57

## 2020-12-04 RX ADMIN — Medication 400 MILLIGRAM(S): at 06:01

## 2020-12-04 RX ADMIN — Medication 15 MILLIGRAM(S): at 00:00

## 2020-12-04 RX ADMIN — Medication 15 MILLIGRAM(S): at 16:05

## 2020-12-04 RX ADMIN — Medication 1000 MILLIGRAM(S): at 23:21

## 2020-12-04 RX ADMIN — Medication 1000 MILLIGRAM(S): at 00:06

## 2020-12-04 RX ADMIN — Medication 15 MILLIGRAM(S): at 04:43

## 2020-12-04 RX ADMIN — Medication 1000 MILLIGRAM(S): at 06:31

## 2020-12-04 RX ADMIN — HYDROMORPHONE HYDROCHLORIDE 0.5 MILLIGRAM(S): 2 INJECTION INTRAMUSCULAR; INTRAVENOUS; SUBCUTANEOUS at 20:21

## 2020-12-04 RX ADMIN — Medication 1000 MILLIGRAM(S): at 13:30

## 2020-12-04 RX ADMIN — Medication 400 MILLIGRAM(S): at 12:57

## 2020-12-04 RX ADMIN — Medication 15 MILLIGRAM(S): at 08:42

## 2020-12-04 RX ADMIN — Medication 400 MILLIGRAM(S): at 17:52

## 2020-12-04 NOTE — PHYSICAL THERAPY INITIAL EVALUATION ADULT - MODALITIES TREATMENT COMMENTS
PAtient with interrupted closure midline abd surgical incision and full thickness wound LUQ at former stoma site

## 2020-12-04 NOTE — DIETITIAN INITIAL EVALUATION ADULT. - ADD RECOMMEND
Through PI ID# [id number], message left for pt to call the [facility].   1) Medical team to advance diet when medically feasible via tolerated route. Consider advancing to clear liquid, followed by low fiber diet as tolerated. 2) Pending diet advancement to low fiber, recommend Ensure Surgery BID to supplement PO intake. 3) Diet education provided, reinforce as needed. 4) RD to remain available and follow-up as medically appropriate.

## 2020-12-04 NOTE — DIETITIAN INITIAL EVALUATION ADULT. - OTHER INFO
weights: Pt reports weight has been relatively stable, reports UBW as ~ 124lbs. Weight per previous admission noted as 119.9lbs (8/25/20), current dosing weight is 132lbs (12/3/20).     Pt now s/p ostomy reversal, currently NPO. Denies any nausea, emesis, reports some abdominal discomfort. Discussed typical diet advancement, pt familiar with low fiber diet due to previous GI surgeries. Briefly reviewed, low fiber diet with patient in anticipation of diet advancement, written materials provided. Patient with no nutrition-related questions at this time. Made aware RD remains available as needed.

## 2020-12-04 NOTE — PHYSICAL THERAPY INITIAL EVALUATION ADULT - ADDITIONAL COMMENTS
Pt reports she lives with  and children in private home with about 2 steps to enter from garage entrance, 1 flight inside to bedroom. Pt reports she is able to stay on first floor upon DC if needed. Pt states she has a shower chair and mechanical recliner. Prior to admission pt was indep with all functional mobility without AD.

## 2020-12-04 NOTE — PROGRESS NOTE ADULT - SUBJECTIVE AND OBJECTIVE BOX
Day 1 of Anesthesia Pain Management Service    SUBJECTIVE: Starting to feel pain now  Pain Scale Score:          [X] Refer to charted pain scores    THERAPY: s/p  100 mcg PF morphine on 12\3\2020      MEDICATIONS  (STANDING):  acetaminophen  IVPB .. 1000 milliGRAM(s) IV Intermittent every 6 hours  acetaminophen  IVPB .. 1000 milliGRAM(s) IV Intermittent every 6 hours  dextrose 5% + sodium chloride 0.45% with potassium chloride 20 mEq/L 1000 milliLiter(s) (100 mL/Hr) IV Continuous <Continuous>  enoxaparin Injectable 40 milliGRAM(s) SubCutaneous daily  ketorolac   Injectable 15 milliGRAM(s) IV Push every 6 hours  ketorolac   Injectable 15 milliGRAM(s) IV Push every 6 hours    MEDICATIONS  (PRN):  HYDROmorphone  Injectable 0.5 milliGRAM(s) IV Push every 4 hours PRN Severe Pain (7 - 10)      OBJECTIVE:    Sedation:        	[X] Alert	 [ ] Drowsy	[ ] Arousable      [ ] Asleep       [ ] Unresponsive    Side Effects:	[X] None 	[ ] Nausea	[ ] Vomiting         [ ] Pruritus  		[ ] Weakness            [ ] Numbness	          [ ] Other:    Vital Signs Last 24 Hrs  T(C): 36.9 (04 Dec 2020 05:43), Max: 37.2 (04 Dec 2020 01:15)  T(F): 98.4 (04 Dec 2020 05:43), Max: 98.9 (04 Dec 2020 01:15)  HR: 81 (04 Dec 2020 05:43) (65 - 98)  BP: 97/60 (04 Dec 2020 05:43) (90/59 - 113/75)  BP(mean): 70 (03 Dec 2020 15:00) (66 - 81)  RR: 18 (04 Dec 2020 05:43) (12 - 18)  SpO2: 98% (04 Dec 2020 05:43) (95% - 100%)    ASSESSMENT/ PLAN  [X] Patient transitioned to prn analgesics  [X] Pain management per primary service, pain service to sign off   [X]Documentation and Verification of current medications

## 2020-12-04 NOTE — PHYSICAL THERAPY INITIAL EVALUATION ADULT - PERTINENT HX OF CURRENT PROBLEM, REHAB EVAL
44 yr old female had acute diverticulitis needed  emergent ostomy creation, now s/p hartmanns reversal. No imaging available.

## 2020-12-04 NOTE — DIETITIAN INITIAL EVALUATION ADULT. - ORAL INTAKE PTA/DIET HISTORY
Pt reports good PO intake and appetite PTA, has been consuming regular diet, NKFA. Pt denies chewing/swallowing difficulty, nausea, vomiting, reports normal ostomy output. Takes vitamin C supplement on occasion.

## 2020-12-04 NOTE — DIETITIAN INITIAL EVALUATION ADULT. - REASON FOR ADMISSION
This is a " 44 yr old female had acute diverticulitis needed  emergent ostomy creation, now s/p hartmanns reversal 12/3."

## 2020-12-04 NOTE — PROGRESS NOTE ADULT - SUBJECTIVE AND OBJECTIVE BOX
Surgery Progress Note    SUBJECTIVE: Pt seen and examined at bedside. Patient comfortable and in no-apparent distress. Pain is controlled. -Gas/-BM. Tolerating sips without nausea or vomiting. Second UCath removed this AM.    Vital Signs Last 24 Hrs  T(C): 36.9 (04 Dec 2020 05:43), Max: 37.2 (04 Dec 2020 01:15)  T(F): 98.4 (04 Dec 2020 05:43), Max: 98.9 (04 Dec 2020 01:15)  HR: 81 (04 Dec 2020 05:43) (65 - 98)  BP: 97/60 (04 Dec 2020 05:43) (90/59 - 113/75)  BP(mean): 70 (03 Dec 2020 15:00) (66 - 81)  RR: 18 (04 Dec 2020 05:43) (12 - 18)  SpO2: 98% (04 Dec 2020 05:43) (95% - 100%)    Physical Exam:  General Appearance: Appears well, NAD  Respiratory: No labored breathing  CV: Pulse regularly present  Abdomen: Soft, nontender, nondistended; midline incision with proveena in place    LABS:                        11.6   14.34 )-----------( 210      ( 04 Dec 2020 06:41 )             36.4     12-04    134<L>  |  102  |  16  ----------------------------<  76  4.4   |  19<L>  |  0.77    Ca    8.2<L>      04 Dec 2020 06:41  Phos  3.4     12-04  Mg     2.0     12-04            INs and OUTs:    12-03-20 @ 07:01  -  12-04-20 @ 07:00  --------------------------------------------------------  IN: 2000 mL / OUT: 810 mL / NET: 1190 mL     Surgery Progress Note    SUBJECTIVE: Pt seen and examined at bedside. Patient comfortable and in no-apparent distress. Pain is controlled. -Gas/-BM. Tolerating sips without nausea or vomiting. Second UCath removed this AM.    Vital Signs Last 24 Hrs  T(C): 36.9 (04 Dec 2020 05:43), Max: 37.2 (04 Dec 2020 01:15)  T(F): 98.4 (04 Dec 2020 05:43), Max: 98.9 (04 Dec 2020 01:15)  HR: 81 (04 Dec 2020 05:43) (65 - 98)  BP: 97/60 (04 Dec 2020 05:43) (90/59 - 113/75)  BP(mean): 70 (03 Dec 2020 15:00) (66 - 81)  RR: 18 (04 Dec 2020 05:43) (12 - 18)  SpO2: 98% (04 Dec 2020 05:43) (95% - 100%)    Physical Exam:  General Appearance: Appears well, NAD  Respiratory: No labored breathing  CV: Pulse regularly present  Abdomen: Soft, nontender, nondistended; midline wound vac in place    LABS:                        11.6   14.34 )-----------( 210      ( 04 Dec 2020 06:41 )             36.4     12-04    134<L>  |  102  |  16  ----------------------------<  76  4.4   |  19<L>  |  0.77    Ca    8.2<L>      04 Dec 2020 06:41  Phos  3.4     12-04  Mg     2.0     12-04            INs and OUTs:    12-03-20 @ 07:01  -  12-04-20 @ 07:00  --------------------------------------------------------  IN: 2000 mL / OUT: 810 mL / NET: 1190 mL

## 2020-12-04 NOTE — DIETITIAN INITIAL EVALUATION ADULT. - ETIOLOGY
related to inability to consume adequate nutrition in the setting of altered GI function (ostomy reversal)

## 2020-12-04 NOTE — PHYSICAL THERAPY INITIAL EVALUATION ADULT - PLANNED THERAPY INTERVENTIONS, PT EVAL
balance training/bed mobility training/gait training/transfer training/GOAL: Pt will perform 13 stairs with or without U HR as needed within 4weeks. bed mobility training/balance training/gait training/transfer training/GOAL: Pt will perform 13 stairs with or without U HR as needed within 4weeks.; Negative Pressure Wound Therapy

## 2020-12-04 NOTE — PHYSICAL THERAPY INITIAL EVALUATION ADULT - DID THE PATIENT HAVE SURGERY?
yes/Jason's reversal through midline exploratory laparotomy.  Splenic flexure taken down and colon mobilized.  Descending colon anastomosed to rectum using 29mm EEA at 13cm from the anal verge.

## 2020-12-04 NOTE — DIETITIAN INITIAL EVALUATION ADULT. - LITERATURE/VIDEOS GIVEN
Provided low-fiber nutrition therapy including importance of avoiding  fiber rich foods, fresh fruits/vegetables, whole grains, and added fiber in processed foods. Discussed chewing foods well and adequate hydration and protein intake. Discussed gradual reintroduction of fiber back into diet once cleared by MD. Pt verbalized understanding and accepted written handout. Patient with no nutrition-related questions at this time. Made aware RD remains available as needed.

## 2020-12-04 NOTE — PROGRESS NOTE ADULT - ASSESSMENT
44 yr old female had acute diverticulitis needed  emergent ostomy creation, now s/p hartmanns reversal 12/3. Hemodynamically stable on floor.    Plan:  - Diet: Sips  - pain control with IV meds while on sips  - Weinberg to be removed at 10:30, f/u TOV this evening  - encourage OOB and ambulating  - DVT ppx: Lovenox  - f/u AM labs     [facility] Team [facility] [phone]

## 2020-12-04 NOTE — PHYSICAL THERAPY INITIAL EVALUATION ADULT - GENERAL OBSERVATIONS, REHAB EVAL
Pt seen for 35min PT ERP Eval. Pt s/p . Pt rec'd semi supine in bed in NAD, soft BP, however VSS, no c/o dizziness/lightheaded, +VAC. Pt with c/o pain at incision site 6/10, Pt premedicated by RN. Pt willing to work with PT.

## 2020-12-05 LAB
ANION GAP SERPL CALC-SCNC: 8 MMOL/L — SIGNIFICANT CHANGE UP (ref 5–17)
BUN SERPL-MCNC: 10 MG/DL — SIGNIFICANT CHANGE UP (ref 7–23)
CALCIUM SERPL-MCNC: 8.4 MG/DL — SIGNIFICANT CHANGE UP (ref 8.4–10.5)
CHLORIDE SERPL-SCNC: 101 MMOL/L — SIGNIFICANT CHANGE UP (ref 96–108)
CO2 SERPL-SCNC: 24 MMOL/L — SIGNIFICANT CHANGE UP (ref 22–31)
CREAT SERPL-MCNC: 0.65 MG/DL — SIGNIFICANT CHANGE UP (ref 0.5–1.3)
GLUCOSE SERPL-MCNC: 113 MG/DL — HIGH (ref 70–99)
HCT VFR BLD CALC: 33.4 % — LOW (ref 34.5–45)
HGB BLD-MCNC: 10.9 G/DL — LOW (ref 11.5–15.5)
MAGNESIUM SERPL-MCNC: 1.8 MG/DL — SIGNIFICANT CHANGE UP (ref 1.6–2.6)
MCHC RBC-ENTMCNC: 29.5 PG — SIGNIFICANT CHANGE UP (ref 27–34)
MCHC RBC-ENTMCNC: 32.6 GM/DL — SIGNIFICANT CHANGE UP (ref 32–36)
MCV RBC AUTO: 90.3 FL — SIGNIFICANT CHANGE UP (ref 80–100)
NRBC # BLD: 0 /100 WBCS — SIGNIFICANT CHANGE UP (ref 0–0)
PHOSPHATE SERPL-MCNC: 1.7 MG/DL — LOW (ref 2.5–4.5)
PLATELET # BLD AUTO: 197 K/UL — SIGNIFICANT CHANGE UP (ref 150–400)
POTASSIUM SERPL-MCNC: 4.1 MMOL/L — SIGNIFICANT CHANGE UP (ref 3.5–5.3)
POTASSIUM SERPL-SCNC: 4.1 MMOL/L — SIGNIFICANT CHANGE UP (ref 3.5–5.3)
RBC # BLD: 3.7 M/UL — LOW (ref 3.8–5.2)
RBC # FLD: 12.8 % — SIGNIFICANT CHANGE UP (ref 10.3–14.5)
SODIUM SERPL-SCNC: 133 MMOL/L — LOW (ref 135–145)
WBC # BLD: 14.05 K/UL — HIGH (ref 3.8–10.5)
WBC # FLD AUTO: 14.05 K/UL — HIGH (ref 3.8–10.5)

## 2020-12-05 RX ORDER — PANTOPRAZOLE SODIUM 20 MG/1
40 TABLET, DELAYED RELEASE ORAL DAILY
Refills: 0 | Status: DISCONTINUED | OUTPATIENT
Start: 2020-12-05 | End: 2020-12-07

## 2020-12-05 RX ORDER — MAGNESIUM SULFATE 500 MG/ML
2 VIAL (ML) INJECTION ONCE
Refills: 0 | Status: COMPLETED | OUTPATIENT
Start: 2020-12-05 | End: 2020-12-05

## 2020-12-05 RX ORDER — ACETAMINOPHEN 500 MG
975 TABLET ORAL EVERY 6 HOURS
Refills: 0 | Status: COMPLETED | OUTPATIENT
Start: 2020-12-05 | End: 2020-12-07

## 2020-12-05 RX ORDER — CALCIUM CARBONATE 500(1250)
1 TABLET ORAL ONCE
Refills: 0 | Status: COMPLETED | OUTPATIENT
Start: 2020-12-05 | End: 2020-12-05

## 2020-12-05 RX ORDER — ACETAMINOPHEN 500 MG
1000 TABLET ORAL ONCE
Refills: 0 | Status: COMPLETED | OUTPATIENT
Start: 2020-12-05 | End: 2020-12-05

## 2020-12-05 RX ORDER — OXYCODONE HYDROCHLORIDE 5 MG/1
5 TABLET ORAL EVERY 4 HOURS
Refills: 0 | Status: DISCONTINUED | OUTPATIENT
Start: 2020-12-05 | End: 2020-12-07

## 2020-12-05 RX ORDER — ONDANSETRON 8 MG/1
4 TABLET, FILM COATED ORAL ONCE
Refills: 0 | Status: COMPLETED | OUTPATIENT
Start: 2020-12-05 | End: 2020-12-05

## 2020-12-05 RX ADMIN — Medication 1000 MILLIGRAM(S): at 11:30

## 2020-12-05 RX ADMIN — Medication 15 MILLIGRAM(S): at 04:12

## 2020-12-05 RX ADMIN — Medication 15 MILLIGRAM(S): at 17:43

## 2020-12-05 RX ADMIN — DEXTROSE MONOHYDRATE, SODIUM CHLORIDE, AND POTASSIUM CHLORIDE 100 MILLILITER(S): 50; .745; 4.5 INJECTION, SOLUTION INTRAVENOUS at 05:48

## 2020-12-05 RX ADMIN — Medication 30 MILLILITER(S): at 09:59

## 2020-12-05 RX ADMIN — Medication 15 MILLIGRAM(S): at 17:13

## 2020-12-05 RX ADMIN — OXYCODONE HYDROCHLORIDE 5 MILLIGRAM(S): 5 TABLET ORAL at 20:22

## 2020-12-05 RX ADMIN — Medication 1 TABLET(S): at 13:43

## 2020-12-05 RX ADMIN — Medication 400 MILLIGRAM(S): at 11:00

## 2020-12-05 RX ADMIN — Medication 15 MILLIGRAM(S): at 02:50

## 2020-12-05 RX ADMIN — Medication 400 MILLIGRAM(S): at 19:40

## 2020-12-05 RX ADMIN — ENOXAPARIN SODIUM 40 MILLIGRAM(S): 100 INJECTION SUBCUTANEOUS at 11:00

## 2020-12-05 RX ADMIN — Medication 15 MILLIGRAM(S): at 10:09

## 2020-12-05 RX ADMIN — Medication 400 MILLIGRAM(S): at 05:42

## 2020-12-05 RX ADMIN — ONDANSETRON 4 MILLIGRAM(S): 8 TABLET, FILM COATED ORAL at 12:24

## 2020-12-05 RX ADMIN — HYDROMORPHONE HYDROCHLORIDE 0.5 MILLIGRAM(S): 2 INJECTION INTRAMUSCULAR; INTRAVENOUS; SUBCUTANEOUS at 05:42

## 2020-12-05 RX ADMIN — OXYCODONE HYDROCHLORIDE 5 MILLIGRAM(S): 5 TABLET ORAL at 21:27

## 2020-12-05 RX ADMIN — Medication 1000 MILLIGRAM(S): at 05:48

## 2020-12-05 RX ADMIN — Medication 15 MILLIGRAM(S): at 09:39

## 2020-12-05 RX ADMIN — PANTOPRAZOLE SODIUM 40 MILLIGRAM(S): 20 TABLET, DELAYED RELEASE ORAL at 19:40

## 2020-12-05 RX ADMIN — HYDROMORPHONE HYDROCHLORIDE 0.5 MILLIGRAM(S): 2 INJECTION INTRAMUSCULAR; INTRAVENOUS; SUBCUTANEOUS at 06:11

## 2020-12-05 RX ADMIN — Medication 1000 MILLIGRAM(S): at 20:20

## 2020-12-05 RX ADMIN — Medication 50 GRAM(S): at 09:39

## 2020-12-05 RX ADMIN — Medication 62.5 MILLIMOLE(S): at 09:40

## 2020-12-05 NOTE — PROGRESS NOTE ADULT - SUBJECTIVE AND OBJECTIVE BOX
Interval Events: Weinberg catheter removed, patient voiding spontaneously.    S: Patient doing well, tolerating her sips of liquids and tea. She denies fevers, chills, nausea, emesis, chest pain, SOB. (+) flatus (-) BM     O: Vital Signs  T(C): 36.7 (12-05 @ 05:57), Max: 37.3 (12-04 @ 08:48)  HR: 94 (12-05 @ 05:57) (77 - 108)  BP: 133/70 (12-05 @ 05:57) (90/63 - 133/70)  RR: 18 (12-05 @ 05:57) (18 - 18)  SpO2: 95% (12-05 @ 05:57) (95% - 99%)  12-04-20 @ 07:01  -  12-05-20 @ 07:00  --------------------------------------------------------  IN: 2668 mL / OUT: 1300 mL / NET: 1368 mL      General: alert and oriented, NAD  Resp: airway patent, respirations unlabored  CVS: regular  Abdomen: softly distended, appropriately tender, incisional vac in place  Extremities: no edema  Skin: warm, dry, appropriate color                          11.6   14.34 )-----------( 210      ( 04 Dec 2020 06:41 )             36.4   12-04    134<L>  |  102  |  16  ----------------------------<  76  4.4   |  19<L>  |  0.77    Ca    8.2<L>      04 Dec 2020 06:41  Phos  3.4     12-04  Mg     2.0     12-04

## 2020-12-05 NOTE — PROGRESS NOTE ADULT - ASSESSMENT
44 yr old female had acute diverticulitis needed  emergent ostomy creation, now s/p hartmanns reversal 12/3. Hemodynamically stable on floor.    Plan:  - Diet: advance to CLD  - VAC change today  - pain control with IV meds while on sips  - encourage OOB and ambulating  - DVT ppx: Lovenox  - f/u AM labs     [facility] [phone]   44 yr old female had acute diverticulitis needed  emergent ostomy creation, now s/p hartmanns reversal 12/3. Hemodynamically stable on floor.    Plan:  - Diet: advance to CLD  - VAC change today  - pain control with PO meds when on CLD  - encourage OOB and ambulating  - DVT ppx: Lovenox  - f/u AM labs     [facility] Team Surgery [phone]

## 2020-12-05 NOTE — PROGRESS NOTE ADULT - SUBJECTIVE AND OBJECTIVE BOX
Surgery Progress Note    SUBJECTIVE: Pt seen and examined at bedside. Patient comfortable and in no-apparent distress. Pain is controlled. +Gas/-BM. Tolerating sips without nausea or vomiting.    Vital Signs Last 24 Hrs  T(C): 36.7 (05 Dec 2020 05:57), Max: 37.3 (04 Dec 2020 08:48)  T(F): 98.1 (05 Dec 2020 05:57), Max: 99.2 (04 Dec 2020 08:48)  HR: 94 (05 Dec 2020 05:57) (77 - 108)  BP: 133/70 (05 Dec 2020 05:57) (90/63 - 133/70)  BP(mean): --  RR: 18 (05 Dec 2020 05:57) (18 - 18)  SpO2: 95% (05 Dec 2020 05:57) (95% - 99%)    Physical Exam:  General Appearance: Appears well, NAD  Respiratory: No labored breathing  CV: Pulse regularly present  Abdomen: Soft, nontender, nondistended; wound vac in place    LABS:                        11.6   14.34 )-----------( 210      ( 04 Dec 2020 06:41 )             36.4     12-04    134<L>  |  102  |  16  ----------------------------<  76  4.4   |  19<L>  |  0.77    Ca    8.2<L>      04 Dec 2020 06:41  Phos  3.4     12-04  Mg     2.0     12-04            INs and OUTs:    12-04-20 @ 07:01  -  12-05-20 @ 07:00  --------------------------------------------------------  IN: 2668 mL / OUT: 1300 mL / NET: 1368 mL

## 2020-12-05 NOTE — PROGRESS NOTE ADULT - ASSESSMENT
45 yo F POD#2 s/p Jason reversal, afebrile and hemodynamically stable.    - start clear liquids in setting of return of bowel function, anticipate advancing tomorrow if tolerates liquids today  - vac change today, plan to dispo patient home with vac in place  - continue standing analgesia with non-narcotics, transition to PO oxycodone for breakthrough    --DEDRA Palacios, [phone]

## 2020-12-06 LAB
ANION GAP SERPL CALC-SCNC: 11 MMOL/L — SIGNIFICANT CHANGE UP (ref 5–17)
BUN SERPL-MCNC: 9 MG/DL — SIGNIFICANT CHANGE UP (ref 7–23)
CALCIUM SERPL-MCNC: 8.2 MG/DL — LOW (ref 8.4–10.5)
CHLORIDE SERPL-SCNC: 103 MMOL/L — SIGNIFICANT CHANGE UP (ref 96–108)
CO2 SERPL-SCNC: 22 MMOL/L — SIGNIFICANT CHANGE UP (ref 22–31)
CREAT SERPL-MCNC: 0.66 MG/DL — SIGNIFICANT CHANGE UP (ref 0.5–1.3)
GLUCOSE SERPL-MCNC: 75 MG/DL — SIGNIFICANT CHANGE UP (ref 70–99)
HCT VFR BLD CALC: 31.3 % — LOW (ref 34.5–45)
HGB BLD-MCNC: 10.4 G/DL — LOW (ref 11.5–15.5)
MAGNESIUM SERPL-MCNC: 1.8 MG/DL — SIGNIFICANT CHANGE UP (ref 1.6–2.6)
MCHC RBC-ENTMCNC: 29.6 PG — SIGNIFICANT CHANGE UP (ref 27–34)
MCHC RBC-ENTMCNC: 33.2 GM/DL — SIGNIFICANT CHANGE UP (ref 32–36)
MCV RBC AUTO: 89.2 FL — SIGNIFICANT CHANGE UP (ref 80–100)
NRBC # BLD: 0 /100 WBCS — SIGNIFICANT CHANGE UP (ref 0–0)
PHOSPHATE SERPL-MCNC: 2.4 MG/DL — LOW (ref 2.5–4.5)
PLATELET # BLD AUTO: 208 K/UL — SIGNIFICANT CHANGE UP (ref 150–400)
POTASSIUM SERPL-MCNC: 4 MMOL/L — SIGNIFICANT CHANGE UP (ref 3.5–5.3)
POTASSIUM SERPL-SCNC: 4 MMOL/L — SIGNIFICANT CHANGE UP (ref 3.5–5.3)
RBC # BLD: 3.51 M/UL — LOW (ref 3.8–5.2)
RBC # FLD: 13 % — SIGNIFICANT CHANGE UP (ref 10.3–14.5)
SODIUM SERPL-SCNC: 136 MMOL/L — SIGNIFICANT CHANGE UP (ref 135–145)
WBC # BLD: 10.54 K/UL — HIGH (ref 3.8–10.5)
WBC # FLD AUTO: 10.54 K/UL — HIGH (ref 3.8–10.5)

## 2020-12-06 RX ORDER — SODIUM CHLORIDE 9 MG/ML
3 INJECTION INTRAMUSCULAR; INTRAVENOUS; SUBCUTANEOUS EVERY 8 HOURS
Refills: 0 | Status: DISCONTINUED | OUTPATIENT
Start: 2020-12-06 | End: 2020-12-07

## 2020-12-06 RX ORDER — MAGNESIUM SULFATE 500 MG/ML
1 VIAL (ML) INJECTION ONCE
Refills: 0 | Status: COMPLETED | OUTPATIENT
Start: 2020-12-06 | End: 2020-12-06

## 2020-12-06 RX ORDER — DEXTROSE MONOHYDRATE, SODIUM CHLORIDE, AND POTASSIUM CHLORIDE 50; .745; 4.5 G/1000ML; G/1000ML; G/1000ML
1000 INJECTION, SOLUTION INTRAVENOUS
Refills: 0 | Status: DISCONTINUED | OUTPATIENT
Start: 2020-12-06 | End: 2020-12-06

## 2020-12-06 RX ADMIN — Medication 100 GRAM(S): at 12:22

## 2020-12-06 RX ADMIN — Medication 975 MILLIGRAM(S): at 17:37

## 2020-12-06 RX ADMIN — Medication 975 MILLIGRAM(S): at 12:12

## 2020-12-06 RX ADMIN — PANTOPRAZOLE SODIUM 40 MILLIGRAM(S): 20 TABLET, DELAYED RELEASE ORAL at 12:13

## 2020-12-06 RX ADMIN — SODIUM CHLORIDE 3 MILLILITER(S): 9 INJECTION INTRAMUSCULAR; INTRAVENOUS; SUBCUTANEOUS at 13:44

## 2020-12-06 RX ADMIN — Medication 975 MILLIGRAM(S): at 12:42

## 2020-12-06 RX ADMIN — Medication 975 MILLIGRAM(S): at 18:07

## 2020-12-06 RX ADMIN — Medication 975 MILLIGRAM(S): at 00:55

## 2020-12-06 RX ADMIN — Medication 62.5 MILLIMOLE(S): at 13:35

## 2020-12-06 RX ADMIN — SODIUM CHLORIDE 3 MILLILITER(S): 9 INJECTION INTRAMUSCULAR; INTRAVENOUS; SUBCUTANEOUS at 21:02

## 2020-12-06 RX ADMIN — OXYCODONE HYDROCHLORIDE 5 MILLIGRAM(S): 5 TABLET ORAL at 21:50

## 2020-12-06 RX ADMIN — Medication 975 MILLIGRAM(S): at 05:17

## 2020-12-06 RX ADMIN — ENOXAPARIN SODIUM 40 MILLIGRAM(S): 100 INJECTION SUBCUTANEOUS at 12:12

## 2020-12-06 RX ADMIN — Medication 975 MILLIGRAM(S): at 00:56

## 2020-12-06 RX ADMIN — Medication 975 MILLIGRAM(S): at 05:15

## 2020-12-06 RX ADMIN — OXYCODONE HYDROCHLORIDE 5 MILLIGRAM(S): 5 TABLET ORAL at 20:59

## 2020-12-06 NOTE — PROGRESS NOTE ADULT - ATTENDING COMMENTS
s/p hartmanns reversal  -await flatus  -oob  -dvt ppx  -pain control
s/p colostomy reversal  -LRD  -oob  -continue vac  -dispo planning

## 2020-12-06 NOTE — PROGRESS NOTE ADULT - ASSESSMENT
44 yr old female had acute diverticulitis needed  emergent ostomy creation, now s/p hartmanns reversal 12/3. Hemodynamically stable on floor.    Plan:  - Diet: CLD  - IVF while not taking much PO  - VAC changed 12/5  - pain control with PO meds   - encourage OOB and ambulating  - DVT ppx: Lovenox  - f/u AM labs   - dispo planning, home with VAC    Red Team Surgery [phone] 44 yr old female had acute diverticulitis needed  emergent ostomy creation, now s/p hartmanns reversal 12/3. Hemodynamically stable on floor.    Plan:  - Diet: LRD  - IV lock  - VAC changed 12/5  - pain control with PO meds   - encourage OOB and ambulating  - DVT ppx: Lovenox  - f/u AM labs   - dispo planning, home with VAC    [facility] Team Surgery [phone]

## 2020-12-06 NOTE — PROGRESS NOTE ADULT - SUBJECTIVE AND OBJECTIVE BOX
Surgery Progress Note    SUBJECTIVE: Pt seen and examined at bedside. Patient comfortable and in no-apparent distress. Yesterday pt nauseous for most of the day, attributed to reflux. She did not vomit, but was not taking much PO. Pain is controlled with PO medications. Pt OOB and ambulating. +flatus/-BM. Wound VAC changed yesterday.    Vital Signs Last 24 Hrs  T(C): 37 (06 Dec 2020 01:18), Max: 37 (05 Dec 2020 16:52)  T(F): 98.6 (06 Dec 2020 01:18), Max: 98.6 (05 Dec 2020 16:52)  HR: 67 (06 Dec 2020 01:18) (67 - 94)  BP: 109/73 (06 Dec 2020 01:18) (108/74 - 133/70)  BP(mean): --  RR: 18 (06 Dec 2020 01:18) (18 - 18)  SpO2: 97% (06 Dec 2020 01:18) (95% - 98%)    Physical Exam:  General Appearance: Appears well, NAD  Respiratory: No labored breathing  CV: Pulse regularly present  Abdomen: Soft, nondistended, appropriately tender; midline wound vac in place    LABS:                        10.9   14.05 )-----------( 197      ( 05 Dec 2020 07:13 )             33.4     12-05    133<L>  |  101  |  10  ----------------------------<  113<H>  4.1   |  24  |  0.65    Ca    8.4      05 Dec 2020 07:11  Phos  1.7     12-05  Mg     1.8     12-05            INs and OUTs:    12-04-20 @ 07:01  -  12-05-20 @ 07:00  --------------------------------------------------------  IN: 2668 mL / OUT: 1300 mL / NET: 1368 mL    12-05-20 @ 07:01  -  12-06-20 @ 04:10  --------------------------------------------------------  IN: 600 mL / OUT: 900 mL / NET: -300 mL     Surgery Progress Note    SUBJECTIVE: Pt seen and examined at bedside. Patient comfortable and in no-apparent distress. Denies N/V. Pain is controlled with PO medications. Pt OOB and ambulating. +flatus/-BM. Wound VAC changed yesterday. Advance to LRD today    Vital Signs Last 24 Hrs  T(C): 37 (06 Dec 2020 01:18), Max: 37 (05 Dec 2020 16:52)  T(F): 98.6 (06 Dec 2020 01:18), Max: 98.6 (05 Dec 2020 16:52)  HR: 67 (06 Dec 2020 01:18) (67 - 94)  BP: 109/73 (06 Dec 2020 01:18) (108/74 - 133/70)  BP(mean): --  RR: 18 (06 Dec 2020 01:18) (18 - 18)  SpO2: 97% (06 Dec 2020 01:18) (95% - 98%)    Physical Exam:  General Appearance: Appears well, NAD  Respiratory: No labored breathing  CV: Pulse regularly present  Abdomen: Soft, nondistended, appropriately tender; midline wound vac in place    LABS:                        10.9   14.05 )-----------( 197      ( 05 Dec 2020 07:13 )             33.4     12-05    133<L>  |  101  |  10  ----------------------------<  113<H>  4.1   |  24  |  0.65    Ca    8.4      05 Dec 2020 07:11  Phos  1.7     12-05  Mg     1.8     12-05            INs and OUTs:    12-04-20 @ 07:01  -  12-05-20 @ 07:00  --------------------------------------------------------  IN: 2668 mL / OUT: 1300 mL / NET: 1368 mL    12-05-20 @ 07:01  -  12-06-20 @ 04:10  --------------------------------------------------------  IN: 600 mL / OUT: 900 mL / NET: -300 mL

## 2020-12-07 ENCOUNTER — TRANSCRIPTION ENCOUNTER (OUTPATIENT)
Age: 45
End: 2020-12-07

## 2020-12-07 VITALS
DIASTOLIC BLOOD PRESSURE: 69 MMHG | RESPIRATION RATE: 18 BRPM | OXYGEN SATURATION: 98 % | HEART RATE: 88 BPM | SYSTOLIC BLOOD PRESSURE: 107 MMHG | TEMPERATURE: 98 F

## 2020-12-07 PROBLEM — K57.92 DIVERTICULITIS OF INTESTINE, PART UNSPECIFIED, WITHOUT PERFORATION OR ABSCESS WITHOUT BLEEDING: Chronic | Status: ACTIVE | Noted: 2020-11-30

## 2020-12-07 LAB
ANION GAP SERPL CALC-SCNC: 12 MMOL/L — SIGNIFICANT CHANGE UP (ref 5–17)
BUN SERPL-MCNC: 13 MG/DL — SIGNIFICANT CHANGE UP (ref 7–23)
CALCIUM SERPL-MCNC: 8.7 MG/DL — SIGNIFICANT CHANGE UP (ref 8.4–10.5)
CHLORIDE SERPL-SCNC: 103 MMOL/L — SIGNIFICANT CHANGE UP (ref 96–108)
CO2 SERPL-SCNC: 23 MMOL/L — SIGNIFICANT CHANGE UP (ref 22–31)
CREAT SERPL-MCNC: 0.67 MG/DL — SIGNIFICANT CHANGE UP (ref 0.5–1.3)
GLUCOSE SERPL-MCNC: 86 MG/DL — SIGNIFICANT CHANGE UP (ref 70–99)
HCT VFR BLD CALC: 33.5 % — LOW (ref 34.5–45)
HGB BLD-MCNC: 10.7 G/DL — LOW (ref 11.5–15.5)
MAGNESIUM SERPL-MCNC: 1.9 MG/DL — SIGNIFICANT CHANGE UP (ref 1.6–2.6)
MCHC RBC-ENTMCNC: 29.2 PG — SIGNIFICANT CHANGE UP (ref 27–34)
MCHC RBC-ENTMCNC: 31.9 GM/DL — LOW (ref 32–36)
MCV RBC AUTO: 91.5 FL — SIGNIFICANT CHANGE UP (ref 80–100)
NRBC # BLD: 0 /100 WBCS — SIGNIFICANT CHANGE UP (ref 0–0)
PHOSPHATE SERPL-MCNC: 3.4 MG/DL — SIGNIFICANT CHANGE UP (ref 2.5–4.5)
PLATELET # BLD AUTO: 256 K/UL — SIGNIFICANT CHANGE UP (ref 150–400)
POTASSIUM SERPL-MCNC: 3.7 MMOL/L — SIGNIFICANT CHANGE UP (ref 3.5–5.3)
POTASSIUM SERPL-SCNC: 3.7 MMOL/L — SIGNIFICANT CHANGE UP (ref 3.5–5.3)
RBC # BLD: 3.66 M/UL — LOW (ref 3.8–5.2)
RBC # FLD: 12.9 % — SIGNIFICANT CHANGE UP (ref 10.3–14.5)
SODIUM SERPL-SCNC: 138 MMOL/L — SIGNIFICANT CHANGE UP (ref 135–145)
WBC # BLD: 9.61 K/UL — SIGNIFICANT CHANGE UP (ref 3.8–10.5)
WBC # FLD AUTO: 9.61 K/UL — SIGNIFICANT CHANGE UP (ref 3.8–10.5)

## 2020-12-07 PROCEDURE — 82962 GLUCOSE BLOOD TEST: CPT

## 2020-12-07 PROCEDURE — 84100 ASSAY OF PHOSPHORUS: CPT

## 2020-12-07 PROCEDURE — C1889: CPT

## 2020-12-07 PROCEDURE — 97116 GAIT TRAINING THERAPY: CPT

## 2020-12-07 PROCEDURE — 97164 PT RE-EVAL EST PLAN CARE: CPT

## 2020-12-07 PROCEDURE — 83735 ASSAY OF MAGNESIUM: CPT

## 2020-12-07 PROCEDURE — 97161 PT EVAL LOW COMPLEX 20 MIN: CPT

## 2020-12-07 PROCEDURE — 88307 TISSUE EXAM BY PATHOLOGIST: CPT

## 2020-12-07 PROCEDURE — 85027 COMPLETE CBC AUTOMATED: CPT

## 2020-12-07 PROCEDURE — 97530 THERAPEUTIC ACTIVITIES: CPT

## 2020-12-07 PROCEDURE — C9399: CPT

## 2020-12-07 PROCEDURE — 80048 BASIC METABOLIC PNL TOTAL CA: CPT

## 2020-12-07 PROCEDURE — C1758: CPT

## 2020-12-07 PROCEDURE — 88304 TISSUE EXAM BY PATHOLOGIST: CPT

## 2020-12-07 RX ORDER — PANTOPRAZOLE SODIUM 20 MG/1
40 TABLET, DELAYED RELEASE ORAL
Refills: 0 | Status: DISCONTINUED | OUTPATIENT
Start: 2020-12-07 | End: 2020-12-07

## 2020-12-07 RX ORDER — ACETAMINOPHEN 500 MG
3 TABLET ORAL
Qty: 0 | Refills: 0 | DISCHARGE
Start: 2020-12-07

## 2020-12-07 RX ORDER — OXYCODONE HYDROCHLORIDE 5 MG/1
1 TABLET ORAL
Qty: 20 | Refills: 0
Start: 2020-12-07 | End: 2020-12-13

## 2020-12-07 RX ORDER — POTASSIUM CHLORIDE 20 MEQ
20 PACKET (EA) ORAL
Refills: 0 | Status: COMPLETED | OUTPATIENT
Start: 2020-12-07 | End: 2020-12-07

## 2020-12-07 RX ADMIN — SODIUM CHLORIDE 3 MILLILITER(S): 9 INJECTION INTRAMUSCULAR; INTRAVENOUS; SUBCUTANEOUS at 12:47

## 2020-12-07 RX ADMIN — OXYCODONE HYDROCHLORIDE 5 MILLIGRAM(S): 5 TABLET ORAL at 09:58

## 2020-12-07 RX ADMIN — Medication 20 MILLIEQUIVALENT(S): at 09:58

## 2020-12-07 RX ADMIN — Medication 975 MILLIGRAM(S): at 16:30

## 2020-12-07 RX ADMIN — Medication 975 MILLIGRAM(S): at 06:00

## 2020-12-07 RX ADMIN — ENOXAPARIN SODIUM 40 MILLIGRAM(S): 100 INJECTION SUBCUTANEOUS at 11:25

## 2020-12-07 RX ADMIN — SODIUM CHLORIDE 3 MILLILITER(S): 9 INJECTION INTRAMUSCULAR; INTRAVENOUS; SUBCUTANEOUS at 05:16

## 2020-12-07 RX ADMIN — PANTOPRAZOLE SODIUM 40 MILLIGRAM(S): 20 TABLET, DELAYED RELEASE ORAL at 08:44

## 2020-12-07 RX ADMIN — OXYCODONE HYDROCHLORIDE 5 MILLIGRAM(S): 5 TABLET ORAL at 10:28

## 2020-12-07 RX ADMIN — Medication 975 MILLIGRAM(S): at 05:17

## 2020-12-07 NOTE — DISCHARGE NOTE PROVIDER - CARE PROVIDER_API CALL
Papo Toure  COLON/RECTAL SURGERY  [address] [place] [address], [address]  [place] [address]  Phone: [phone]  Fax: [phone]  Follow Up Time: 2 weeks   28yo , at 39w5d, dated by LMP with MAY 10/29/19, with contractions for the last 2 hours. She reports contractions every 5 min, about 5/10 in intensity. Denies LOF, VB and reports good FM. No complications this pregnancy. 1cm in the office last week.

## 2020-12-07 NOTE — DISCHARGE NOTE PROVIDER - NSDCCPCAREPLAN_GEN_ALL_CORE_FT
PRINCIPAL DISCHARGE DIAGNOSIS  Diagnosis: Diverticulitis  Assessment and Plan of Treatment: Activity- No heavy lifting or straining over 15 lbs for the next two weeks;  Driving- Please do not drive until your pain is well controlled and you do not need to take pain medications.  You may shower-Do not submerge or scrub incision sites.  Please pat dry incisions/dressings.  Staples will be removed at your first post op visit.

## 2020-12-07 NOTE — DISCHARGE NOTE PROVIDER - HOSPITAL COURSE
44 yr old female w hx acute diverticulitis needed s/p emergent ostomy creation.  Now for reversal.  colonoscopy 12/2/2020 in office.  On 12/3 she underwent a Reversal of Hartmanns with VAC placement. The patient tolerated the procedure well, there were no complications. The patient was extubated in the OR, transferred to the PACU in stable condition and then transferred to a surgical floor. Once bowel function returned, diet was advanced as tolerated. The patient had wound care with VAC changes and her pain was controlled. At the time of discharge, the patient was hemodynamically stable, tolerating PO diet, voiding urine, passing stool, ambulating and was comfortable with adequate pain control. The patient was instructed to follow up with Dr. Toure within 2 weeks after discharge. The patient felt comfortable with discharge. The patient was discharged to home. The patient had no other issues.

## 2020-12-07 NOTE — DISCHARGE NOTE PROVIDER - NSDCCPTREATMENT_GEN_ALL_CORE_FT
PRINCIPAL PROCEDURE  Procedure: Reversal, Jason procedure  Findings and Treatment: Jason's reversal through midline exploratory laparotomy.  Splenic flexure taken down and colon mobilized.  Descending colon anastomosed to rectum using 29mm EEA at 13cm from the anal verge.  Negative leak test.

## 2020-12-07 NOTE — PROGRESS NOTE ADULT - ASSESSMENT
44 yr old female had acute diverticulitis needed  emergent ostomy creation, now s/p hartmanns reversal 12/3. Hemodynamically stable on floor.    Plan:  - Diet: LRD  - IV lock  - VAC changed 12/5  - pain control with PO meds   - encourage OOB and ambulating  - DVT ppx: Lovenox  - f/u AM labs   - dispo planning, home with VAC    Red Team Surgery [phone] 44 yr old female had acute diverticulitis needed  emergent ostomy creation, now s/p hartmanns reversal 12/3. Hemodynamically stable on floor.    Plan:  - Diet: LRD  - IV lock  - VAC changed 12/5  - pain control with PO meds   - encourage OOB and ambulating  - DVT ppx: Lovenox  - f/u AM labs   - dispo planning, home with VAC today    Red Team Surgery [phone]

## 2020-12-07 NOTE — DISCHARGE NOTE PROVIDER - NSDCQMAMI_CARD_ALL_CORE
69F with h/o CA and recent fistula surgery presents with fevers, chills, cough, SOB, found to be febrile with rhonchi and rales on physical exam. check CXR, labs, panculture. Need to r/o viral vs infectious respiratory infection +/- bacteremia. No 69F with h/o CA and recent fistula surgery presents with fevers, chills, cough, SOB, found to be febrile with rhonchi and rales on physical exam. check CXR, labs, panculture. Need to r/o viral vs infectious respiratory infection +/- bacteremia.    Due to untreated ovarian CA and no outpatient followup a/w R > L leg swelling there exists clinical concern for DVT/PE when considering patients SOB. CTA cannot be performed 2/2 creatinine clearance. V/Q scan and potential for admission was discussed with the patient who understands the risks of a DVT and a PE which can include but is not limited to lung disease, heart disease, SOB, or death. Patient acknowledged understanding of these risks with readback and defferred the imaging modality as she does not wish to be admitted to the hospital if the results were positive. Patient was discharged with abx and told to follow up if she experiences any red flag symptoms.

## 2020-12-07 NOTE — DISCHARGE NOTE PROVIDER - NSDCMRMEDTOKEN_GEN_ALL_CORE_FT
acetaminophen 325 mg oral tablet: 3 tab(s) orally every 6 hours  oxyCODONE 5 mg oral tablet: 1-2  tab(s) orally every 4-6 hours, As Needed MDD:6

## 2020-12-07 NOTE — PROGRESS NOTE ADULT - SUBJECTIVE AND OBJECTIVE BOX
Surgery Progress Note    SUBJECTIVE: No acute events overnight. Pt seen and examined at bedside. Patient comfortable. No nausea, vomiting, diarrhea. Pain is controlled. +Flatus/+BM. Tolerating diet.    Vital Signs Last 24 Hrs  T(C): 37.1 (07 Dec 2020 01:14), Max: 37.1 (06 Dec 2020 20:58)  T(F): 98.7 (07 Dec 2020 01:14), Max: 98.7 (06 Dec 2020 20:58)  HR: 86 (07 Dec 2020 01:14) (69 - 96)  BP: 104/67 (07 Dec 2020 01:14) (98/65 - 110/73)  BP(mean): --  RR: 18 (07 Dec 2020 01:14) (18 - 18)  SpO2: 95% (07 Dec 2020 01:14) (95% - 98%)    Physical Exam:  General Appearance: Appears well, NAD  Respiratory: No labored breathing  CV: Pulse regularly present  Abdomen: Soft, nondistended, appropriately tender; midline wound vac in place    LABS:                        10.4   10.54 )-----------( 208      ( 06 Dec 2020 07:25 )             31.3     12-06    136  |  103  |  9   ----------------------------<  75  4.0   |  22  |  0.66    Ca    8.2<L>      06 Dec 2020 07:25  Phos  2.4     12-06  Mg     1.8     12-06            INs and OUTs:    12-05-20 @ 07:01  -  12-06-20 @ 07:00  --------------------------------------------------------  IN: 720 mL / OUT: 1250 mL / NET: -530 mL    12-06-20 @ 07:01  -  12-07-20 @ 05:48  --------------------------------------------------------  IN: 270 mL / OUT: 0 mL / NET: 270 mL        Medications:  MEDICATIONS  (STANDING):  acetaminophen   Tablet .. 975 milliGRAM(s) Oral every 6 hours  enoxaparin Injectable 40 milliGRAM(s) SubCutaneous daily  pantoprazole  Injectable 40 milliGRAM(s) IV Push daily  sodium chloride 0.9% lock flush 3 milliLiter(s) IV Push every 8 hours    MEDICATIONS  (PRN):  oxyCODONE    IR 5 milliGRAM(s) Oral every 4 hours PRN Severe Pain (7 - 10)   Surgery Progress Note    SUBJECTIVE: No acute events overnight. Pt seen and examined at bedside. Patient comfortable. No nausea, vomiting, diarrhea. Pain is controlled. +Flatus/+BM. Tolerating diet. Pt states she is feeling well and ready to go home    Vital Signs Last 24 Hrs  T(C): 37.1 (07 Dec 2020 01:14), Max: 37.1 (06 Dec 2020 20:58)  T(F): 98.7 (07 Dec 2020 01:14), Max: 98.7 (06 Dec 2020 20:58)  HR: 86 (07 Dec 2020 01:14) (69 - 96)  BP: 104/67 (07 Dec 2020 01:14) (98/65 - 110/73)  BP(mean): --  RR: 18 (07 Dec 2020 01:14) (18 - 18)  SpO2: 95% (07 Dec 2020 01:14) (95% - 98%)    Physical Exam:  General Appearance: Appears well, NAD  Respiratory: No labored breathing  CV: Pulse regularly present  Abdomen: Soft, nondistended, appropriately tender; midline wound vac in place    LABS:                        10.4   10.54 )-----------( 208      ( 06 Dec 2020 07:25 )             31.3     12-06    136  |  103  |  9   ----------------------------<  75  4.0   |  22  |  0.66    Ca    8.2<L>      06 Dec 2020 07:25  Phos  2.4     12-06  Mg     1.8     12-06            INs and OUTs:    12-05-20 @ 07:01  -  12-06-20 @ 07:00  --------------------------------------------------------  IN: 720 mL / OUT: 1250 mL / NET: -530 mL    12-06-20 @ 07:01  -  12-07-20 @ 05:48  --------------------------------------------------------  IN: 270 mL / OUT: 0 mL / NET: 270 mL        Medications:  MEDICATIONS  (STANDING):  acetaminophen   Tablet .. 975 milliGRAM(s) Oral every 6 hours  enoxaparin Injectable 40 milliGRAM(s) SubCutaneous daily  pantoprazole  Injectable 40 milliGRAM(s) IV Push daily  sodium chloride 0.9% lock flush 3 milliLiter(s) IV Push every 8 hours    MEDICATIONS  (PRN):  oxyCODONE    IR 5 milliGRAM(s) Oral every 4 hours PRN Severe Pain (7 - 10)

## 2020-12-07 NOTE — DISCHARGE NOTE NURSING/CASE MANAGEMENT/SOCIAL WORK - NSSCTYPOFSERV_GEN_ALL_CORE
SKilled  RN for  SN eval and   VAC  changes   Mon, Wed, Fri.    Agency will contact you  to  arrange time of visit.  If you have any question to same  feel free to  contact  agency

## 2020-12-07 NOTE — DISCHARGE NOTE NURSING/CASE MANAGEMENT/SOCIAL WORK - PATIENT PORTAL LINK FT
You can access the FollowMyHealth Patient Portal offered by [facility] by registering at the following website: [email or web address]. By joining [facility]’s FollowMyHealth portal, you will also be able to view your health information using other applications (apps) compatible with our system.

## 2020-12-08 ENCOUNTER — NON-APPOINTMENT (OUTPATIENT)
Age: 45
End: 2020-12-08

## 2020-12-09 LAB — SURGICAL PATHOLOGY STUDY: SIGNIFICANT CHANGE UP

## 2020-12-14 ENCOUNTER — APPOINTMENT (OUTPATIENT)
Dept: COLORECTAL SURGERY | Facility: CLINIC | Age: 45
End: 2020-12-14
Payer: COMMERCIAL

## 2020-12-14 VITALS
OXYGEN SATURATION: 100 % | SYSTOLIC BLOOD PRESSURE: 107 MMHG | HEART RATE: 95 BPM | DIASTOLIC BLOOD PRESSURE: 75 MMHG | TEMPERATURE: 98.3 F

## 2020-12-14 PROCEDURE — 99024 POSTOP FOLLOW-UP VISIT: CPT

## 2020-12-15 NOTE — HISTORY OF PRESENT ILLNESS
[FreeTextEntry1] : Pleasant 44-year-old white female presents for her first postoperative visit. She is approximately 11 days status post takedown of colostomy and reversal of Jason for perforated diverticulitis. She had a rapid and uneventful postoperative recuperation and was discharged home on approximately postoperative day #4.\par \par She looks and feels well. She is having some form of an allergic reaction manifested by significant itching of her extremities and abdomen. She is taking no medications. She does have a wound VAC in place. She is tolerating diet and moving her bowels. She denies temperature elevation or any blood per rectum.\par \par Physical examination reveals a soft, nontender, nondistended abdomen. The existent VAC device was taken down.  Her left upper quadrant colostomy site is 50% smaller than it was. The midline wound looks good with no evidence of infection. Half of the surgical staples were removed. She no longer requires the VAC device.\par \par I asked her to remain on a low-residue diet. Perhaps she was having a reaction to the adhesive from the VAC system and this should improve with the VAC removed. I asked her to take Benadryl as needed. I will see her in one week to reassess the wound and remove the remaining staples.\par \par

## 2020-12-19 LAB
CULTURE RESULTS: SIGNIFICANT CHANGE UP
SPECIMEN SOURCE: SIGNIFICANT CHANGE UP

## 2020-12-21 ENCOUNTER — APPOINTMENT (OUTPATIENT)
Dept: COLORECTAL SURGERY | Facility: CLINIC | Age: 45
End: 2020-12-21
Payer: COMMERCIAL

## 2020-12-21 PROCEDURE — 99024 POSTOP FOLLOW-UP VISIT: CPT

## 2020-12-21 NOTE — HISTORY OF PRESENT ILLNESS
[FreeTextEntry1] : 44 year old female who is almost 3 weeks status post reversal of Jason.She presents today for wound check.\par \par On physical examination her abdomen is soft, nontender and  nondistended. Her left upper quadrant colostomy site continues to granulate and contract. The midline wound is almost totally healed with only one or 2 remaining draining areas. Any residual surgical staples were removed.\par \par Patient was assured that she is making an excellent recuperation. I want her to shower daily and replace a dry sterile dressing.\par \par I will see her in 3 weeks for sigmoidoscopy under sedation.

## 2020-12-28 RX ORDER — METRONIDAZOLE 500 MG/1
500 TABLET ORAL
Qty: 3 | Refills: 0 | Status: DISCONTINUED | COMMUNITY
Start: 2020-10-15 | End: 2020-12-28

## 2020-12-28 RX ORDER — NEOMYCIN SULFATE 500 MG/1
500 TABLET ORAL
Qty: 3 | Refills: 0 | Status: DISCONTINUED | COMMUNITY
Start: 2020-10-15 | End: 2020-12-28

## 2021-01-09 DIAGNOSIS — Z01.818 ENCOUNTER FOR OTHER PREPROCEDURAL EXAMINATION: ICD-10-CM

## 2021-01-10 ENCOUNTER — APPOINTMENT (OUTPATIENT)
Dept: DISASTER EMERGENCY | Facility: CLINIC | Age: 46
End: 2021-01-10

## 2021-01-12 LAB — SARS-COV-2 N GENE NPH QL NAA+PROBE: NOT DETECTED

## 2021-01-13 ENCOUNTER — APPOINTMENT (OUTPATIENT)
Dept: COLORECTAL SURGERY | Facility: CLINIC | Age: 46
End: 2021-01-13
Payer: COMMERCIAL

## 2021-01-13 PROCEDURE — 99072 ADDL SUPL MATRL&STAF TM PHE: CPT

## 2021-01-13 PROCEDURE — 45330 DIAGNOSTIC SIGMOIDOSCOPY: CPT | Mod: 58

## 2021-02-26 NOTE — PHYSICAL THERAPY INITIAL EVALUATION ADULT - FUNCTIONAL LIMITATIONS, PT EVAL
Post-Op Assessment Note    CV Status:  Stable  Pain Score: 2    Pain management: adequate     Mental Status:  Alert and awake   Hydration Status:  Euvolemic   PONV Controlled:  Controlled   Airway Patency:  Patent      Post Op Vitals Reviewed: Yes      Staff: CRNA         No complications documented      BP (P) 97/55 (02/26/21 1545)    Temp (P) 97 7 °F (36 5 °C) (02/26/21 1545)    Pulse (P) 84 (02/26/21 1545)   Resp (P) 18 (02/26/21 1545)    SpO2 (P) 100 % (02/26/21 1545) self-care/work/home management/community/leisure

## 2021-03-17 NOTE — CHART NOTE - NSCHARTNOTEFT_GEN_A_CORE
Below on behalf of Dr. Toure, Patient noted to have acute on chronic diverticulitis.       Final Diagnosis  1. Colostomy, end  - Skin and segment of colon with mucosal erosion,  focal fat  necrosis, multinucleated giant cells, fibrosis and chronic  inflammation  - One reactive lymph node    2. Colon, rectosigmoid, resection  - Segment of colon with acute and chronic diverticulitis,  perforation, acute and chronic serositis involving the opened  resection margin  - Stapled resection margin is unremarkable    3. Colon, left, resection  - Segment of colon with chronic diverticulitis  - Surgical resection margins viable  - Opened resection margin with inactive chronic  nonspecific  colitis

## 2021-05-06 ENCOUNTER — OUTPATIENT (OUTPATIENT)
Dept: OUTPATIENT SERVICES | Facility: HOSPITAL | Age: 46
LOS: 1 days | End: 2021-05-06
Payer: COMMERCIAL

## 2021-05-06 ENCOUNTER — APPOINTMENT (OUTPATIENT)
Dept: CT IMAGING | Facility: HOSPITAL | Age: 46
End: 2021-05-06
Payer: COMMERCIAL

## 2021-05-06 ENCOUNTER — RESULT REVIEW (OUTPATIENT)
Age: 46
End: 2021-05-06

## 2021-05-06 DIAGNOSIS — Z93.3 COLOSTOMY STATUS: Chronic | ICD-10-CM

## 2021-05-06 DIAGNOSIS — Z98.82 BREAST IMPLANT STATUS: Chronic | ICD-10-CM

## 2021-05-06 DIAGNOSIS — Z00.8 ENCOUNTER FOR OTHER GENERAL EXAMINATION: ICD-10-CM

## 2021-05-06 DIAGNOSIS — Z98.51 TUBAL LIGATION STATUS: Chronic | ICD-10-CM

## 2021-05-06 PROCEDURE — 74176 CT ABD & PELVIS W/O CONTRAST: CPT | Mod: 26

## 2021-05-06 PROCEDURE — 74176 CT ABD & PELVIS W/O CONTRAST: CPT

## 2021-05-11 ENCOUNTER — TRANSCRIPTION ENCOUNTER (OUTPATIENT)
Age: 46
End: 2021-05-11

## 2021-05-11 ENCOUNTER — NON-APPOINTMENT (OUTPATIENT)
Age: 46
End: 2021-05-11

## 2021-06-04 ENCOUNTER — APPOINTMENT (OUTPATIENT)
Dept: DISASTER EMERGENCY | Facility: CLINIC | Age: 46
End: 2021-06-04

## 2021-06-04 ENCOUNTER — NON-APPOINTMENT (OUTPATIENT)
Age: 46
End: 2021-06-04

## 2021-09-16 ENCOUNTER — APPOINTMENT (OUTPATIENT)
Dept: OBGYN | Facility: CLINIC | Age: 46
End: 2021-09-16

## 2021-11-27 NOTE — H&P PST ADULT - NSCAFFEAMTFREQ_GEN_ALL_CORE_SD
TM completed 10-day quarantine period for positive Covid result.  No new or worsening symptoms documented on symptom tracker.    Return to Work Date:  11/28    RTW letter sent to TM/Sup.  Symptom monitoring discontinued.               1-2 cups/cans per day

## 2022-02-25 ENCOUNTER — APPOINTMENT (OUTPATIENT)
Dept: OBGYN | Facility: CLINIC | Age: 47
End: 2022-02-25

## 2022-08-28 NOTE — PATIENT PROFILE ADULT - NSPROEDAABILITYLEARNOTHER_GEN_A_NUR
Please call Dr Ferguson' patient coordinator, Aydee, to schedule your 2 week post-op appointment with Nora.  She can be reached during normal business hours (M-F, 8am-5pm) at [phone].   If you have any questions or concerns after-hours, please contact the on-call surgeon at [phone].     If you have concerns regarding your incision, dressing, or any urgent issues that require more immediate attention or an in-person visit, please go to the nearest [facility] location (there are many located throughout the [facility]).  [facility] is open 7 days a week from 8 AM until 8 PM.  No appointment is needed.  Patients are seen on a walk-in basis.     [place] location is across from [facility].  [facility] [address]  [phone]  [place] location is nearby [facility].  Address [facility] [address].  [phone]   [place] location is at [facility] [employer] [facility].  Address is [address], [place].   [phone]   none

## 2023-09-12 ENCOUNTER — APPOINTMENT (RX ONLY)
Dept: URBAN - METROPOLITAN AREA CLINIC 116 | Facility: CLINIC | Age: 48
Setting detail: DERMATOLOGY
End: 2023-09-12

## 2023-09-12 DIAGNOSIS — L82.1 OTHER SEBORRHEIC KERATOSIS: ICD-10-CM

## 2023-09-12 DIAGNOSIS — L85.3 XEROSIS CUTIS: ICD-10-CM | Status: INADEQUATELY CONTROLLED

## 2023-09-12 DIAGNOSIS — Z80.8 FAMILY HISTORY OF MALIGNANT NEOPLASM OF OTHER ORGANS OR SYSTEMS: ICD-10-CM

## 2023-09-12 DIAGNOSIS — D18.0 HEMANGIOMA: ICD-10-CM

## 2023-09-12 DIAGNOSIS — L81.3 CAFÉ AU LAIT SPOTS: ICD-10-CM

## 2023-09-12 DIAGNOSIS — L90.5 SCAR CONDITIONS AND FIBROSIS OF SKIN: ICD-10-CM

## 2023-09-12 DIAGNOSIS — L81.4 OTHER MELANIN HYPERPIGMENTATION: ICD-10-CM

## 2023-09-12 DIAGNOSIS — D22 MELANOCYTIC NEVI: ICD-10-CM

## 2023-09-12 DIAGNOSIS — L81.8 OTHER SPECIFIED DISORDERS OF PIGMENTATION: ICD-10-CM

## 2023-09-12 DIAGNOSIS — L57.3 POIKILODERMA OF CIVATTE: ICD-10-CM

## 2023-09-12 PROBLEM — D22.5 MELANOCYTIC NEVI OF TRUNK: Status: ACTIVE | Noted: 2023-09-12

## 2023-09-12 PROBLEM — D18.01 HEMANGIOMA OF SKIN AND SUBCUTANEOUS TISSUE: Status: ACTIVE | Noted: 2023-09-12

## 2023-09-12 PROCEDURE — 99204 OFFICE O/P NEW MOD 45 MIN: CPT

## 2023-09-12 PROCEDURE — ? COUNSELING

## 2023-09-12 PROCEDURE — ? PRESCRIPTION MEDICATION MANAGEMENT

## 2023-09-12 ASSESSMENT — LOCATION SIMPLE DESCRIPTION DERM
LOCATION SIMPLE: LEFT HAND
LOCATION SIMPLE: LOWER BACK
LOCATION SIMPLE: RIGHT ANKLE
LOCATION SIMPLE: LEFT FOREARM
LOCATION SIMPLE: CHEST
LOCATION SIMPLE: RIGHT THIGH
LOCATION SIMPLE: UPPER BACK
LOCATION SIMPLE: RIGHT HAND
LOCATION SIMPLE: RIGHT PRETIBIAL REGION
LOCATION SIMPLE: RIGHT UPPER BACK
LOCATION SIMPLE: ABDOMEN
LOCATION SIMPLE: RIGHT FOREARM
LOCATION SIMPLE: LEFT PRETIBIAL REGION
LOCATION SIMPLE: LEFT ACHILLES SKIN

## 2023-09-12 ASSESSMENT — LOCATION DETAILED DESCRIPTION DERM
LOCATION DETAILED: PERIUMBILICAL SKIN
LOCATION DETAILED: LEFT ACHILLES SKIN
LOCATION DETAILED: RIGHT DISTAL DORSAL FOREARM
LOCATION DETAILED: LEFT PROXIMAL DORSAL FOREARM
LOCATION DETAILED: RIGHT LATERAL ABDOMEN
LOCATION DETAILED: RIGHT LATERAL POSTERIOR ANKLE
LOCATION DETAILED: INFERIOR THORACIC SPINE
LOCATION DETAILED: RIGHT ANTERIOR DISTAL THIGH
LOCATION DETAILED: RIGHT SUPERIOR MEDIAL UPPER BACK
LOCATION DETAILED: EPIGASTRIC SKIN
LOCATION DETAILED: RIGHT MEDIAL UPPER BACK
LOCATION DETAILED: RIGHT PROXIMAL PRETIBIAL REGION
LOCATION DETAILED: LEFT ULNAR DORSAL HAND
LOCATION DETAILED: UPPER STERNUM
LOCATION DETAILED: LEFT PROXIMAL PRETIBIAL REGION
LOCATION DETAILED: SUPERIOR LUMBAR SPINE
LOCATION DETAILED: RIGHT ULNAR DORSAL HAND

## 2023-09-12 ASSESSMENT — LOCATION ZONE DERM
LOCATION ZONE: LEG
LOCATION ZONE: TRUNK
LOCATION ZONE: ARM
LOCATION ZONE: HAND

## 2023-09-12 NOTE — PROCEDURE: PRESCRIPTION MEDICATION MANAGEMENT
Plan: Patient advised to moisturize daily
Render In Strict Bullet Format?: No
Detail Level: Detailed

## 2023-09-12 NOTE — PROCEDURE: COUNSELING
Detail Level: Generalized
Sunscreen Recommendations: Almeta Flax protection
Detail Level: Detailed
Detail Level: Simple
Detail Level: Zone

## 2023-12-29 NOTE — DISCHARGE NOTE NURSING/CASE MANAGEMENT/SOCIAL WORK - FLU SEASON?
Transition of Care Plan:    RUR: 18%  Prior Level of Functioning: Senior apt complex, [address] and personal care aide for IADLs. Ind w ADLs and no DME.  Disposition: Referred to [facility] hosp[facility]   If SNF or IPR: Date FOC offered:   Date FOC received:   Accepting facility:   Date authorization started with reference number:   Date authorization received and expires:   Follow up appointments:   DME needed:   Transportation at discharge:   IM/IMM Medicare/ letter given:   Is patient a [place] and connected with [facility]?    If yes, was [place] transfer form completed and [facility] notified?   Caregiver Contact: DaughterMichaela ([phone]   Discharge Caregiver contacted prior to discharge?   Care Conference needed?   Barriers to discharge:        During IDRs this date CM briefly met w daughterMichaela who was at the bedside.  CM introduced self and followed up on daughter's status.  CM will continue to follow for BEN needs.    Update 2:20pm: Rec'd a phone call from attending, Dr Washington requesting referral to [facility] hosp[facility].  CM completed referral in [facility] and called [facility] to let them know.  Spoke w Shelly and she will follow up w nurse onsite at [facility].      Update 3:45pm: Phone call to [facility] to f/u on referral to see when they will be able to open patient and it sounds like hospice already spoke w Palliative and won't be able to open patient to services until tomorrow.  CM updated the nurse, Kathrin.     MERLYN Bhagat CCM  Care Management      Yes...

## 2024-03-17 NOTE — DISCHARGE NOTE PROVIDER - NSDCACTIVITY_GEN_ALL_CORE
Do not drive or operate machinery/Showering allowed/Do not make important decisions/No heavy lifting/straining show